# Patient Record
Sex: FEMALE | Race: WHITE | NOT HISPANIC OR LATINO
[De-identification: names, ages, dates, MRNs, and addresses within clinical notes are randomized per-mention and may not be internally consistent; named-entity substitution may affect disease eponyms.]

---

## 2017-01-09 ENCOUNTER — APPOINTMENT (OUTPATIENT)
Dept: ORTHOPEDIC SURGERY | Facility: CLINIC | Age: 62
End: 2017-01-09

## 2017-05-23 ENCOUNTER — APPOINTMENT (OUTPATIENT)
Dept: ORTHOPEDIC SURGERY | Facility: CLINIC | Age: 62
End: 2017-05-23

## 2017-05-23 DIAGNOSIS — M65.321 TRIGGER FINGER, RIGHT INDEX FINGER: ICD-10-CM

## 2017-06-07 ENCOUNTER — APPOINTMENT (OUTPATIENT)
Dept: ORTHOPEDIC SURGERY | Facility: AMBULATORY SURGERY CENTER | Age: 62
End: 2017-06-07

## 2017-06-07 ENCOUNTER — RESULT REVIEW (OUTPATIENT)
Age: 62
End: 2017-06-07

## 2017-06-07 ENCOUNTER — OUTPATIENT (OUTPATIENT)
Dept: OUTPATIENT SERVICES | Facility: HOSPITAL | Age: 62
LOS: 1 days | Discharge: HOME | End: 2017-06-07
Payer: COMMERCIAL

## 2017-06-07 PROCEDURE — 26055 INCISE FINGER TENDON SHEATH: CPT | Mod: F5

## 2017-06-07 PROCEDURE — 26145 TENDON EXCISION PALM/FINGER: CPT | Mod: 59,F9

## 2017-06-12 DIAGNOSIS — M65.311 TRIGGER THUMB, RIGHT THUMB: ICD-10-CM

## 2017-06-12 DIAGNOSIS — M65.351 TRIGGER FINGER, RIGHT LITTLE FINGER: ICD-10-CM

## 2017-06-12 DIAGNOSIS — Z87.891 PERSONAL HISTORY OF NICOTINE DEPENDENCE: ICD-10-CM

## 2017-06-12 DIAGNOSIS — Z85.828 PERSONAL HISTORY OF OTHER MALIGNANT NEOPLASM OF SKIN: ICD-10-CM

## 2017-06-12 DIAGNOSIS — Z88.6 ALLERGY STATUS TO ANALGESIC AGENT: ICD-10-CM

## 2017-06-12 LAB — SURGICAL PATHOLOGY STUDY: SIGNIFICANT CHANGE UP

## 2017-06-16 ENCOUNTER — APPOINTMENT (OUTPATIENT)
Dept: ORTHOPEDIC SURGERY | Facility: CLINIC | Age: 62
End: 2017-06-16

## 2017-06-16 DIAGNOSIS — M65.351 TRIGGER FINGER, RIGHT LITTLE FINGER: ICD-10-CM

## 2017-06-16 DIAGNOSIS — M65.311 TRIGGER THUMB, RIGHT THUMB: ICD-10-CM

## 2018-05-10 ENCOUNTER — APPOINTMENT (OUTPATIENT)
Dept: ORTHOPEDIC SURGERY | Facility: CLINIC | Age: 63
End: 2018-05-10
Payer: COMMERCIAL

## 2018-05-10 DIAGNOSIS — M25.531 PAIN IN RIGHT WRIST: ICD-10-CM

## 2018-05-10 PROCEDURE — 99214 OFFICE O/P EST MOD 30 MIN: CPT | Mod: 25

## 2018-05-10 PROCEDURE — 20605 DRAIN/INJ JOINT/BURSA W/O US: CPT | Mod: RT

## 2018-05-10 PROCEDURE — 73110 X-RAY EXAM OF WRIST: CPT | Mod: 50

## 2018-11-14 ENCOUNTER — APPOINTMENT (OUTPATIENT)
Dept: ORTHOPEDIC SURGERY | Facility: CLINIC | Age: 63
End: 2018-11-14
Payer: COMMERCIAL

## 2018-11-14 DIAGNOSIS — M65.341 TRIGGER FINGER, RIGHT RING FINGER: ICD-10-CM

## 2018-11-14 PROCEDURE — 99214 OFFICE O/P EST MOD 30 MIN: CPT

## 2018-12-11 ENCOUNTER — MOBILE ON CALL (OUTPATIENT)
Age: 63
End: 2018-12-11

## 2018-12-11 ENCOUNTER — CLINICAL ADVICE (OUTPATIENT)
Age: 63
End: 2018-12-11

## 2018-12-18 ENCOUNTER — APPOINTMENT (OUTPATIENT)
Dept: ORTHOPEDIC SURGERY | Facility: CLINIC | Age: 63
End: 2018-12-18
Payer: COMMERCIAL

## 2018-12-18 VITALS — BODY MASS INDEX: 22.02 KG/M2 | WEIGHT: 129 LBS | HEIGHT: 64 IN | RESPIRATION RATE: 16 BRPM

## 2018-12-18 PROCEDURE — 99214 OFFICE O/P EST MOD 30 MIN: CPT | Mod: 25

## 2018-12-18 PROCEDURE — 20550 NJX 1 TENDON SHEATH/LIGAMENT: CPT | Mod: RT

## 2019-06-24 ENCOUNTER — APPOINTMENT (OUTPATIENT)
Dept: INTERNAL MEDICINE | Facility: CLINIC | Age: 64
End: 2019-06-24
Payer: MEDICARE

## 2019-06-24 ENCOUNTER — NON-APPOINTMENT (OUTPATIENT)
Age: 64
End: 2019-06-24

## 2019-06-24 VITALS — DIASTOLIC BLOOD PRESSURE: 72 MMHG | SYSTOLIC BLOOD PRESSURE: 120 MMHG

## 2019-06-24 VITALS
OXYGEN SATURATION: 99 % | HEIGHT: 64 IN | BODY MASS INDEX: 22.02 KG/M2 | HEART RATE: 83 BPM | TEMPERATURE: 98.6 F | WEIGHT: 129 LBS | SYSTOLIC BLOOD PRESSURE: 130 MMHG | DIASTOLIC BLOOD PRESSURE: 76 MMHG

## 2019-06-24 DIAGNOSIS — Z78.9 OTHER SPECIFIED HEALTH STATUS: ICD-10-CM

## 2019-06-24 DIAGNOSIS — Z87.891 PERSONAL HISTORY OF NICOTINE DEPENDENCE: ICD-10-CM

## 2019-06-24 DIAGNOSIS — Z91.018 ALLERGY TO OTHER FOODS: ICD-10-CM

## 2019-06-24 DIAGNOSIS — G44.009 CLUSTER HEADACHE SYNDROME, UNSPECIFIED, NOT INTRACTABLE: ICD-10-CM

## 2019-06-24 DIAGNOSIS — Z82.69 FAMILY HISTORY OF OTHER DISEASES OF THE MUSCULOSKELETAL SYSTEM AND CONNECTIVE TISSUE: ICD-10-CM

## 2019-06-24 DIAGNOSIS — G44.049 CHRONIC PAROXYSMAL HEMICRANIA, NOT INTRACTABLE: ICD-10-CM

## 2019-06-24 DIAGNOSIS — Z98.890 OTHER SPECIFIED POSTPROCEDURAL STATES: ICD-10-CM

## 2019-06-24 DIAGNOSIS — Z81.8 FAMILY HISTORY OF OTHER MENTAL AND BEHAVIORAL DISORDERS: ICD-10-CM

## 2019-06-24 DIAGNOSIS — Z83.3 FAMILY HISTORY OF DIABETES MELLITUS: ICD-10-CM

## 2019-06-24 DIAGNOSIS — Z82.49 FAMILY HISTORY OF ISCHEMIC HEART DISEASE AND OTHER DISEASES OF THE CIRCULATORY SYSTEM: ICD-10-CM

## 2019-06-24 DIAGNOSIS — Z82.3 FAMILY HISTORY OF STROKE: ICD-10-CM

## 2019-06-24 PROCEDURE — G0402 INITIAL PREVENTIVE EXAM: CPT

## 2019-06-24 PROCEDURE — G0404: CPT

## 2019-06-24 PROCEDURE — 36415 COLL VENOUS BLD VENIPUNCTURE: CPT

## 2019-06-24 RX ORDER — TRAMADOL HYDROCHLORIDE 50 MG/1
50 TABLET, COATED ORAL
Refills: 0 | Status: ACTIVE | COMMUNITY
Start: 2019-06-24

## 2019-06-24 RX ORDER — MULTIVIT-MIN/FOLIC/VIT K/LYCOP 400-300MCG
1000 TABLET ORAL
Refills: 0 | Status: ACTIVE | COMMUNITY
Start: 2019-06-24

## 2019-07-18 ENCOUNTER — LABORATORY RESULT (OUTPATIENT)
Age: 64
End: 2019-07-18

## 2019-07-18 ENCOUNTER — APPOINTMENT (OUTPATIENT)
Dept: RHEUMATOLOGY | Facility: CLINIC | Age: 64
End: 2019-07-18
Payer: MEDICARE

## 2019-07-18 VITALS
BODY MASS INDEX: 22.02 KG/M2 | HEIGHT: 64 IN | WEIGHT: 129 LBS | SYSTOLIC BLOOD PRESSURE: 149 MMHG | HEART RATE: 80 BPM | DIASTOLIC BLOOD PRESSURE: 78 MMHG | TEMPERATURE: 98.2 F | OXYGEN SATURATION: 98 %

## 2019-07-18 DIAGNOSIS — M65.4 RADIAL STYLOID TENOSYNOVITIS [DE QUERVAIN]: ICD-10-CM

## 2019-07-18 DIAGNOSIS — Z85.828 PERSONAL HISTORY OF OTHER MALIGNANT NEOPLASM OF SKIN: ICD-10-CM

## 2019-07-18 DIAGNOSIS — M24.131 OTHER ARTICULAR CARTILAGE DISORDERS, RIGHT WRIST: ICD-10-CM

## 2019-07-18 DIAGNOSIS — M25.531 PAIN IN RIGHT WRIST: ICD-10-CM

## 2019-07-18 DIAGNOSIS — M25.532 PAIN IN RIGHT WRIST: ICD-10-CM

## 2019-07-18 DIAGNOSIS — H04.123 DRY EYE SYNDROME OF BILATERAL LACRIMAL GLANDS: ICD-10-CM

## 2019-07-18 PROCEDURE — 99205 OFFICE O/P NEW HI 60 MIN: CPT | Mod: 25

## 2019-07-18 PROCEDURE — 36415 COLL VENOUS BLD VENIPUNCTURE: CPT

## 2019-07-18 NOTE — REVIEW OF SYSTEMS
[Feeling Tired] : feeling tired [Dry Eyes] : dryness of the eyes [Orthopnea] : orthopnea [Vomiting] : vomiting [Arthralgias] : arthralgias [Joint Pain] : joint pain [Limb Pain] : limb pain [Fever] : no fever [Chills] : no chills [Feeling Poorly] : not feeling poorly [Eye Pain] : no eye pain [Red Eyes] : eyes not red [Eyesight Problems] : no eyesight problems [Discharge From Eyes] : no purulent discharge from the eyes [Eyes Itch] : no itching of the eyes [Earache] : no earache [Loss Of Hearing] : no hearing loss [Sore Throat] : no sore throat [Hoarseness] : no hoarseness [Heart Rate Is Slow] : the heart rate was not slow [Heart Rate Is Fast] : the heart rate was not fast [Chest Pain] : no chest pain [Shortness Of Breath] : no shortness of breath [Cough] : no cough [SOB on Exertion] : no shortness of breath during exertion [Abdominal Pain] : no abdominal pain [Constipation] : no constipation [Diarrhea] : no diarrhea [Heartburn] : no heartburn [Melena] : no melena [Dysuria] : no dysuria [Incontinence] : no incontinence [Joint Swelling] : no joint swelling [Joint Stiffness] : no joint stiffness [Limb Swelling] : no limb swelling [Skin Lesions] : no skin lesions [Skin Wound] : no skin wound [Itching] : no itching [Change In A Mole] : no change in a mole [Dizziness] : no dizziness [Fainting] : no fainting [Anxiety] : no anxiety [Depression] : no depression [Easy Bleeding] : no tendency for easy bleeding [Easy Bruising] : no tendency for easy bruising [Swollen Glands] : no swollen glands [Swollen Glands In The Neck] : no swollen glands in the neck [FreeTextEntry7] : Nausea , vomiting with HA

## 2019-07-18 NOTE — HISTORY OF PRESENT ILLNESS
[Fatigue] : fatigue [Dry Mouth] : dry mouth [Decreased ROM] : decreased range of motion [Difficulty Walking] : difficulty walking [Dry Eyes] : dry eyes [FreeTextEntry1] : Pt referred by Dr. Beatty and Dr. Saunders for Rheumatology evaluation\par 63 year old very pleasant female,  right hand dominant. \par She has multiple hand and wrist related problems, including multiple finger recurrent stenosing tenosynovitis, required steroid injections and release surgeries all fingers on right except 4th , right TFCC tear including ulnar styloid and foveal attachment. She is also complaining of right radial wrist pain. \par left De Quervain's tenosynovitis and s/p release surgery. \par Most recently she is experiencing left ulnar styloid pain radiating to left elbow. \par surgical path: Fibrotic tenosynovium with focal old hemorrhage, no active inflammation reported. \par R hip pain, she thinks compensatory behavior bc of back pain after injury. \par intermittently Rt> more hand function loss, can not open the jars. \par She had recently Rheum work up  negative RF, CCP, ESR, CRP and MADDY\par MRI of the R wrist performed 12/2018 reactive edema and cystic change within the ulnar styloid with associated fluid and debris in the pre styloids recess, possibility of inflammatory arthropathy should be considered. 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 \par She was told has dry eyes and uses  Restasis for dry eyes, but pt does not feel any sicca symptoms, able to produce tears. \par At age 27 she had traumatic fall, with vertebral fracture, required surgery. \par ROS: negative for alopecia, photosensitivity, Raynaud's,  psoriasis \par chronic and intermittent 2nd left toe  pain,  suspected mortons neuroma, but she did not proceed for US. \par She does editorial work and uses computer/typing. \par No personal or family hx of autoimmune disease. \par \par  [Anorexia] : no anorexia [Weight Loss] : no weight loss [Fever] : no fever [Chills] : no chills [Depression] : no depression [Malar Facial Rash] : no malar facial rash [Skin Lesions] : no lesions [Skin Nodules] : no skin nodules [Oral Ulcers] : no oral ulcers [Cough] : no cough [Shortness of Breath] : no shortness of breath [Chest Pain] : no chest pain [Morning Stiffness] : no morning stiffness [Falls] : no falls [Difficulty Standing] : no difficulty standing [Dyspnea] : no dyspnea [Myalgias] : no myalgias [Muscle Weakness] : no muscle weakness [Muscle Spasms] : no muscle spasms [Muscle Cramping] : no muscle cramping [Visual Changes] : no visual changes [Eye Pain] : no eye pain [Eye Redness] : no eye redness

## 2019-07-18 NOTE — ASSESSMENT
[FreeTextEntry1] : 63 year old very pleasant female referred for Rheum consultation. \par She has multiple hand and wrist related problems, including multiple finger recurrent stenosing tenosynovitis, required steroid injections and release surgeries all fingers on right except 4th , right TFCC tear including ulnar styloid and foveal attachment. She is also complaining of right radial wrist pain. \par left De Quervain's tenosynovitis and s/p release surgery. \par Most recently she is experiencing left ulnar styloid pain radiating to left elbow. \par surgical path: Fibrotic tenosynovium with focal old hemorrhage, no active inflammation reported. \par Rheum work up: negative RF, CCP, ESR, CRP and MADDY\par MRI of the R wrist performed 12/2018 reactive edema and cystic change within the ulnar styloid with associated fluid and debris in the pre styloids recess, possibility of inflammatory arthropathy should be considered. \par ON exam pt with no active synovitis or dactylitis and with normal Rheum labs, low suspicious for underlying inflammatory arthritis. \par Tears in the TFCC contain high signal fluid on fat-suppressed T1-weighted images. With a traumatic TFCC tear, fluid or contrast material is usually present in the distal radioulnar joint and ask  Ortho if prestyloid and styloid debris could be seen in TFCC tear ? \par No erosions reported on current MRI R wrist MRI. \par \par 1. Will obtain b/l hand and wrist Xray looking for RA or inflammatory arthritis related changes such as erosions, also will obatin b/l hand and wrist US. \par Uric acid level\par 2. Check sjogren's Ab given reported dry eyes. \par 3. Check lyme and tick disease serology. \par 4. Will discuss the case with Dr. Saunders \par \par \par f/u in 2 weeks

## 2019-07-18 NOTE — PHYSICAL EXAM
[General Appearance - Alert] : alert [General Appearance - In No Acute Distress] : in no acute distress [General Appearance - Well Nourished] : well nourished [General Appearance - Well Developed] : well developed [Sclera] : the sclera and conjunctiva were normal [PERRL With Normal Accommodation] : pupils were equal in size, round, and reactive to light [Outer Ear] : the ears and nose were normal in appearance [Hearing Threshold Finger Rub Not [place]] : hearing was normal [Examination Of The Oral Cavity] : the lips and gums were normal [Neck Appearance] : the appearance of the neck was normal [Neck Cervical Mass (___cm)] : no neck mass was observed [Jugular Venous Distention Increased] : there was no jugular-venous distention [Thyroid Diffuse Enlargement] : the thyroid was not enlarged [Thyroid Nodule] : there were no palpable thyroid nodules [Respiration, Rhythm And Depth] : normal respiratory rhythm and effort [Auscultation Breath Sounds / Voice Sounds] : lungs were clear to auscultation bilaterally [Heart Rate And Rhythm] : heart rate was normal and rhythm regular [Heart Sounds] : normal S1 and S2 [Heart Sounds Gallop] : no gallops [Murmurs] : no murmurs [Heart Sounds Pericardial Friction Rub] : no pericardial rub [Arterial Pulses Carotid] : carotid pulses were normal with no bruits [Edema] : there was no peripheral edema [Veins - Varicosity Changes] : there were no varicosital changes [Abdomen Soft] : soft [Abdomen Tenderness] : non-tender [Abdomen Mass (___ Cm)] : no abdominal mass palpated [Abdomen Hernia] : no hernia was discovered [Cervical Lymph Nodes Enlarged Posterior Bilaterally] : posterior cervical [Cervical Lymph Nodes Enlarged Anterior Bilaterally] : anterior cervical [Supraclavicular Lymph Nodes Enlarged Bilaterally] : supraclavicular [No CVA Tenderness] : no ~M costovertebral angle tenderness [No Spinal Tenderness] : no spinal tenderness [Abnormal Walk] : normal gait [Nail Clubbing] : no clubbing  or cyanosis of the fingernails [Musculoskeletal - Swelling] : no joint swelling seen [Motor Tone] : muscle strength and tone were normal [] : no rash [Skin Lesions] : no skin lesions [Sensation] : the sensory exam was normal to light touch and pinprick [Motor Exam] : the motor exam was normal [Affect] : the affect was normal [Mood] : the mood was normal [FreeTextEntry1] : left CMC tenderness and tenderness on b/l ulnar styloid with no synovitis, no MCP/PIP/DIP joint tenderness, synovitis, b/l wrist with mildy limited ROM, impaired  on left.

## 2019-07-19 ENCOUNTER — TRANSCRIPTION ENCOUNTER (OUTPATIENT)
Age: 64
End: 2019-07-19

## 2019-07-19 LAB
B BURGDOR AB SER-IMP: NEGATIVE
B BURGDOR IGG+IGM SER QL: 0.58 INDEX
ENA SS-A AB SER IA-ACNC: <0.2 AL
ENA SS-B AB SER IA-ACNC: <0.2 AL
URATE SERPL-MCNC: 4 MG/DL

## 2019-07-22 LAB
A PHAGOCYTOPH IGG TITR SER IF: NORMAL TITER
B BURGDOR AB SER QL IA: POSITIVE
B MICROTI IGG TITR SER: NORMAL TITER
E CHAFFEENSIS IGG TITR SER IF: NORMAL TITER

## 2019-07-23 LAB
25(OH)D3 SERPL-MCNC: 32.7 NG/ML
ALBUMIN SERPL ELPH-MCNC: 4.6 G/DL
ALP BLD-CCNC: 62 U/L
ALT SERPL-CCNC: 9 U/L
ANA SER IF-ACNC: NEGATIVE
ANION GAP SERPL CALC-SCNC: 14 MMOL/L
APPEARANCE: CLEAR
AST SERPL-CCNC: 14 U/L
BACTERIA: NEGATIVE
BASOPHILS # BLD AUTO: 0.04 K/UL
BASOPHILS NFR BLD AUTO: 1 %
BILIRUB SERPL-MCNC: 0.2 MG/DL
BILIRUBIN URINE: NEGATIVE
BLOOD URINE: NEGATIVE
BUN SERPL-MCNC: 8 MG/DL
CALCIUM SERPL-MCNC: 10 MG/DL
CCP AB SER IA-ACNC: 8 UNITS
CHLORIDE SERPL-SCNC: 103 MMOL/L
CHOLEST SERPL-MCNC: 176 MG/DL
CHOLEST/HDLC SERPL: 3.2 RATIO
CO2 SERPL-SCNC: 26 MMOL/L
COLOR: YELLOW
CREAT SERPL-MCNC: 0.7 MG/DL
CREAT SPEC-SCNC: 89 MG/DL
CRP SERPL-MCNC: <0.1 MG/DL
EOSINOPHIL # BLD AUTO: 0.12 K/UL
EOSINOPHIL NFR BLD AUTO: 3 %
ERYTHROCYTE [SEDIMENTATION RATE] IN BLOOD BY WESTERGREN METHOD: 5 MM/HR
ESTIMATED AVERAGE GLUCOSE: 108 MG/DL
FOLATE SERPL-MCNC: >20 NG/ML
GLUCOSE QUALITATIVE U: NEGATIVE
GLUCOSE SERPL-MCNC: 105 MG/DL
HBA1C MFR BLD HPLC: 5.4 %
HCT VFR BLD CALC: 42 %
HDLC SERPL-MCNC: 55 MG/DL
HGB BLD-MCNC: 13 G/DL
HYALINE CASTS: 0 /LPF
IMM GRANULOCYTES NFR BLD AUTO: 0.2 %
KETONES URINE: NEGATIVE
LDLC SERPL CALC-MCNC: 100 MG/DL
LEUKOCYTE ESTERASE URINE: NEGATIVE
LYMPHOCYTES # BLD AUTO: 1.25 K/UL
LYMPHOCYTES NFR BLD AUTO: 31.1 %
MAGNESIUM SERPL-MCNC: 2.2 MG/DL
MAN DIFF?: NORMAL
MCHC RBC-ENTMCNC: 31 GM/DL
MCHC RBC-ENTMCNC: 31.6 PG
MCV RBC AUTO: 102.2 FL
MICROALBUMIN 24H UR DL<=1MG/L-MCNC: <1.2 MG/DL
MICROALBUMIN/CREAT 24H UR-RTO: NORMAL MG/G
MICROSCOPIC-UA: NORMAL
MONOCYTES # BLD AUTO: 0.47 K/UL
MONOCYTES NFR BLD AUTO: 11.7 %
NEUTROPHILS # BLD AUTO: 2.13 K/UL
NEUTROPHILS NFR BLD AUTO: 53 %
NITRITE URINE: NEGATIVE
PH URINE: 6
PLATELET # BLD AUTO: 207 K/UL
POTASSIUM SERPL-SCNC: 5.2 MMOL/L
PROT SERPL-MCNC: 7.1 G/DL
PROTEIN URINE: NEGATIVE
RBC # BLD: 4.11 M/UL
RBC # FLD: 12.2 %
RED BLOOD CELLS URINE: 5 /HPF
RF+CCP IGG SER-IMP: NEGATIVE
RHEUMATOID FACT SER QL: 12 IU/ML
SODIUM SERPL-SCNC: 143 MMOL/L
SPECIFIC GRAVITY URINE: 1.02
SQUAMOUS EPITHELIAL CELLS: 1 /HPF
TRIGL SERPL-MCNC: 105 MG/DL
TSH SERPL-ACNC: 1.57 UIU/ML
UROBILINOGEN URINE: NORMAL
VIT B12 SERPL-MCNC: 1221 PG/ML
WBC # FLD AUTO: 4.02 K/UL
WHITE BLOOD CELLS URINE: 1 /HPF

## 2019-08-01 ENCOUNTER — APPOINTMENT (OUTPATIENT)
Dept: ULTRASOUND IMAGING | Facility: CLINIC | Age: 64
End: 2019-08-01

## 2019-08-01 ENCOUNTER — APPOINTMENT (OUTPATIENT)
Dept: RADIOLOGY | Facility: CLINIC | Age: 64
End: 2019-08-01

## 2019-08-01 LAB

## 2019-08-09 ENCOUNTER — APPOINTMENT (OUTPATIENT)
Dept: RHEUMATOLOGY | Facility: CLINIC | Age: 64
End: 2019-08-09

## 2019-08-29 ENCOUNTER — TRANSCRIPTION ENCOUNTER (OUTPATIENT)
Age: 64
End: 2019-08-29

## 2019-09-17 ENCOUNTER — APPOINTMENT (OUTPATIENT)
Dept: OTOLARYNGOLOGY | Facility: CLINIC | Age: 64
End: 2019-09-17
Payer: MEDICARE

## 2019-09-17 VITALS
DIASTOLIC BLOOD PRESSURE: 86 MMHG | WEIGHT: 128 LBS | HEART RATE: 82 BPM | SYSTOLIC BLOOD PRESSURE: 147 MMHG | HEIGHT: 64 IN | BODY MASS INDEX: 21.85 KG/M2

## 2019-09-17 DIAGNOSIS — H93.291 OTHER ABNORMAL AUDITORY PERCEPTIONS, RIGHT EAR: ICD-10-CM

## 2019-09-17 PROCEDURE — 92557 COMPREHENSIVE HEARING TEST: CPT

## 2019-09-17 PROCEDURE — G0268 REMOVAL OF IMPACTED WAX MD: CPT

## 2019-09-17 PROCEDURE — 99203 OFFICE O/P NEW LOW 30 MIN: CPT | Mod: 25

## 2019-09-17 PROCEDURE — 92567 TYMPANOMETRY: CPT

## 2019-09-17 NOTE — CONSULT LETTER
[Dear  ___] : Dear  [unfilled], [Consult Letter:] : I had the pleasure of evaluating your patient, [unfilled]. [Consult Closing:] : Thank you very much for allowing me to participate in the care of this patient.  If you have any questions, please do not hesitate to contact me. [Please see my note below.] : Please see my note below. [Sincerely,] : Sincerely, [FreeTextEntry3] : Heather Sanchez MD\par

## 2019-09-17 NOTE — HISTORY OF PRESENT ILLNESS
[de-identified] : WILEY MURPHY is a 64 year patient With right-sided hearing loss, fullness, and tinnitus for approximately one week. She was found to have cerumen impaction on exam. She tried over-the-counter wax removal drops. She felt her symptoms became worse. She has no otalgia, otorrhea, or dizziness\par \par History of recurrent ear infections-no\par Prior ear surgery-no\par History of otologic trauma-no\par Noise exposure-no\par Family history of hearing loss-no\par Prior audiogram-no recent testing

## 2019-09-17 NOTE — ASSESSMENT
[FreeTextEntry1] : Cerumen impaction was removed from the right ear. She felt better afterwards. Audiogram showed bilateral high-frequency sensorineural hearing loss\par \par PLAN\par \par -findings and management options discussed in detail with the patient. \par -good aural hygiene\par -avoid using cotton swabs in the ears\par -wax removal drops as needed. \par -noise precautions\par -annual audiogram\par -follow up in one year\par -call and return earlier if any concerns. \par

## 2019-09-24 ENCOUNTER — APPOINTMENT (OUTPATIENT)
Dept: OTOLARYNGOLOGY | Facility: CLINIC | Age: 64
End: 2019-09-24

## 2019-12-09 ENCOUNTER — APPOINTMENT (OUTPATIENT)
Dept: ORTHOPEDIC SURGERY | Facility: CLINIC | Age: 64
End: 2019-12-09
Payer: MEDICARE

## 2019-12-09 VITALS — RESPIRATION RATE: 16 BRPM | WEIGHT: 128 LBS | HEIGHT: 64 IN | BODY MASS INDEX: 21.85 KG/M2

## 2019-12-09 DIAGNOSIS — M25.532 PAIN IN LEFT WRIST: ICD-10-CM

## 2019-12-09 PROCEDURE — 20605 DRAIN/INJ JOINT/BURSA W/O US: CPT | Mod: LT

## 2019-12-09 PROCEDURE — 99214 OFFICE O/P EST MOD 30 MIN: CPT | Mod: 25

## 2021-03-03 ENCOUNTER — APPOINTMENT (OUTPATIENT)
Dept: INTERNAL MEDICINE | Facility: CLINIC | Age: 66
End: 2021-03-03

## 2021-05-12 ENCOUNTER — APPOINTMENT (OUTPATIENT)
Dept: INTERNAL MEDICINE | Facility: CLINIC | Age: 66
End: 2021-05-12

## 2021-09-27 ENCOUNTER — NON-APPOINTMENT (OUTPATIENT)
Age: 66
End: 2021-09-27

## 2021-09-27 ENCOUNTER — APPOINTMENT (OUTPATIENT)
Dept: OTOLARYNGOLOGY | Facility: CLINIC | Age: 66
End: 2021-09-27
Payer: MEDICARE

## 2021-09-27 VITALS
DIASTOLIC BLOOD PRESSURE: 76 MMHG | OXYGEN SATURATION: 97 % | TEMPERATURE: 97.8 F | SYSTOLIC BLOOD PRESSURE: 157 MMHG | BODY MASS INDEX: 21.85 KG/M2 | HEIGHT: 64 IN | WEIGHT: 128 LBS | HEART RATE: 78 BPM

## 2021-09-27 DIAGNOSIS — H61.23 IMPACTED CERUMEN, BILATERAL: ICD-10-CM

## 2021-09-27 PROCEDURE — 99214 OFFICE O/P EST MOD 30 MIN: CPT | Mod: 25

## 2021-09-27 PROCEDURE — 69210 REMOVE IMPACTED EAR WAX UNI: CPT

## 2021-09-27 NOTE — PHYSICAL EXAM
[Binocular Microscopic Exam] : Binocular microscopic exam was performed [Removed] : palatine tonsils previously removed [Normal] : no rashes [FreeTextEntry8] : deep cerumen impaction removed with suction after pretreatment w/ H2O2 [FreeTextEntry9] : cerumen impaction removed with suction

## 2021-09-27 NOTE — HISTORY OF PRESENT ILLNESS
[de-identified] : R ear itching & mild hearing loss; her husb says she turns the TV up too loud. No tinnitus. Denies: vertigo, ear pain, drainage, frequent loud noise exp/shooting, frequent infections, hx of ear surgery or IV antibiotics/chemo; denies a FHx of hearing loss. Qtip use: no but uses earplugs at night

## 2021-09-27 NOTE — ASSESSMENT
[FreeTextEntry1] : RTC when able for scheduled audio. \par \par Discussed the importance not putting qtips or other foreign objects in the ears.\par

## 2021-09-27 NOTE — REVIEW OF SYSTEMS
[Patient Intake Form Reviewed] : Patient intake form was reviewed [FreeTextEntry1] : as per HPI, all others negative

## 2021-10-12 ENCOUNTER — APPOINTMENT (OUTPATIENT)
Dept: OTOLARYNGOLOGY | Facility: CLINIC | Age: 66
End: 2021-10-12
Payer: MEDICARE

## 2021-10-12 ENCOUNTER — APPOINTMENT (OUTPATIENT)
Dept: OTOLARYNGOLOGY | Facility: CLINIC | Age: 66
End: 2021-10-12

## 2021-10-12 VITALS
HEART RATE: 79 BPM | WEIGHT: 128 LBS | SYSTOLIC BLOOD PRESSURE: 155 MMHG | DIASTOLIC BLOOD PRESSURE: 74 MMHG | BODY MASS INDEX: 21.85 KG/M2 | OXYGEN SATURATION: 66 % | HEIGHT: 64 IN | TEMPERATURE: 97.6 F

## 2021-10-12 PROCEDURE — 92550 TYMPANOMETRY & REFLEX THRESH: CPT

## 2021-10-12 PROCEDURE — 92557 COMPREHENSIVE HEARING TEST: CPT

## 2021-10-12 PROCEDURE — 99213 OFFICE O/P EST LOW 20 MIN: CPT

## 2021-10-12 NOTE — DATA REVIEWED
[de-identified] : 9/19: nl to mod SNHL AU\par - Immitance testing w/ type A AU\par today: stable hearing

## 2021-10-12 NOTE — HISTORY OF PRESENT ILLNESS
[de-identified] : R ear itching & mild hearing loss; her husb says she turns the TV up too loud. No tinnitus. Denies: vertigo, ear pain, drainage, frequent loud noise exp/shooting, frequent infections, hx of ear surgery or IV antibiotics/chemo; denies a FHx of hearing loss. Qtip use: no but uses earplugs at night\par Now s/p audio

## 2021-11-30 ENCOUNTER — APPOINTMENT (OUTPATIENT)
Dept: INTERNAL MEDICINE | Facility: CLINIC | Age: 66
End: 2021-11-30
Payer: MEDICARE

## 2021-11-30 VITALS
OXYGEN SATURATION: 97 % | DIASTOLIC BLOOD PRESSURE: 89 MMHG | HEART RATE: 102 BPM | TEMPERATURE: 97.3 F | HEIGHT: 64 IN | BODY MASS INDEX: 21.77 KG/M2 | WEIGHT: 127.5 LBS | SYSTOLIC BLOOD PRESSURE: 164 MMHG

## 2021-11-30 PROCEDURE — 93000 ELECTROCARDIOGRAM COMPLETE: CPT

## 2021-11-30 PROCEDURE — 99213 OFFICE O/P EST LOW 20 MIN: CPT | Mod: 25

## 2021-12-16 ENCOUNTER — OUTPATIENT (OUTPATIENT)
Dept: OUTPATIENT SERVICES | Facility: HOSPITAL | Age: 66
LOS: 1 days | End: 2021-12-16

## 2021-12-16 ENCOUNTER — APPOINTMENT (OUTPATIENT)
Dept: MAMMOGRAPHY | Facility: CLINIC | Age: 66
End: 2021-12-16
Payer: MEDICARE

## 2021-12-16 PROCEDURE — 77063 BREAST TOMOSYNTHESIS BI: CPT | Mod: 26

## 2021-12-16 PROCEDURE — 77067 SCR MAMMO BI INCL CAD: CPT | Mod: 26

## 2021-12-28 ENCOUNTER — APPOINTMENT (OUTPATIENT)
Dept: DERMATOLOGY | Facility: CLINIC | Age: 66
End: 2021-12-28
Payer: MEDICARE

## 2021-12-28 ENCOUNTER — NON-APPOINTMENT (OUTPATIENT)
Age: 66
End: 2021-12-28

## 2021-12-28 DIAGNOSIS — L50.3 DERMATOGRAPHIC URTICARIA: ICD-10-CM

## 2021-12-28 DIAGNOSIS — L29.9 PRURITUS, UNSPECIFIED: ICD-10-CM

## 2021-12-28 PROCEDURE — 99203 OFFICE O/P NEW LOW 30 MIN: CPT

## 2022-01-07 ENCOUNTER — APPOINTMENT (OUTPATIENT)
Dept: INTERNAL MEDICINE | Facility: CLINIC | Age: 67
End: 2022-01-07
Payer: MEDICARE

## 2022-01-07 VITALS
HEIGHT: 64 IN | WEIGHT: 127 LBS | TEMPERATURE: 98.2 F | HEART RATE: 90 BPM | SYSTOLIC BLOOD PRESSURE: 160 MMHG | OXYGEN SATURATION: 97 % | BODY MASS INDEX: 21.68 KG/M2 | DIASTOLIC BLOOD PRESSURE: 82 MMHG

## 2022-01-07 DIAGNOSIS — R09.89 OTHER SPECIFIED SYMPTOMS AND SIGNS INVOLVING THE CIRCULATORY AND RESPIRATORY SYSTEMS: ICD-10-CM

## 2022-01-07 PROCEDURE — 99214 OFFICE O/P EST MOD 30 MIN: CPT

## 2022-01-13 ENCOUNTER — APPOINTMENT (OUTPATIENT)
Dept: OTOLARYNGOLOGY | Facility: CLINIC | Age: 67
End: 2022-01-13
Payer: MEDICARE

## 2022-01-13 VITALS
DIASTOLIC BLOOD PRESSURE: 84 MMHG | OXYGEN SATURATION: 97 % | HEART RATE: 98 BPM | WEIGHT: 127 LBS | BODY MASS INDEX: 21.68 KG/M2 | TEMPERATURE: 97.7 F | SYSTOLIC BLOOD PRESSURE: 150 MMHG | HEIGHT: 64 IN

## 2022-01-13 PROCEDURE — 31575 DIAGNOSTIC LARYNGOSCOPY: CPT

## 2022-01-13 PROCEDURE — 99214 OFFICE O/P EST MOD 30 MIN: CPT | Mod: 25

## 2022-01-13 NOTE — PHYSICAL EXAM
[] : septum deviated to the right [Removed] : palatine tonsils previously removed [Laryngoscopy Performed] : laryngoscopy was performed, see procedure section for findings [Normal] : assessment of respiratory effort is normal

## 2022-01-13 NOTE — DATA REVIEWED
[de-identified] : 9/19: nl to mod SNHL AU\par - Immitance testing w/ type A AU\par 10/21: stable hearing

## 2022-01-13 NOTE — ASSESSMENT
[FreeTextEntry1] : Reassured her & discussed a possible MBS but since she hasn't had any episodes in nearly a month will hold off for now. Discussed safe swallowing techniques. \par \par Reviewed reflux precautions and provided the patient with the corresponding educational handout.\par \par Rpt audio 10/22\par

## 2022-01-13 NOTE — REASON FOR VISIT
[Subsequent Evaluation] : a subsequent evaluation for [FreeTextEntry2] : trouble with breathing while eating

## 2022-01-13 NOTE — PROCEDURE
[de-identified] : We discussed the elevated risk of liberation of viral particles from the nasopharynx if the patient were to be asymptomatically infected with COVID-19; after weighing the risks & benefits the decision was made to proceed. The procedure was performed while wearing appropriate PPE and a camera attached to a video system was used to maximize separation from the patient. The scope was handled appropriately. \par Indication: requirement for exam not possible via anterior examination; globus, laryngospasm\par After verbal consent and NO administration of an aerosolized phenylephrine/lidocaine mix, examination was performed with a flexible endoscope placed through the nose. Findings:\par Nasopharynx: unremarkable\par Soft palate, lateral and posterior pharyngeal walls: unremarkable\par Base of tongue & lingual tonsil: minimal retrodisplacement\par Vallecula: unremarkable\par Epiglottis: unremarkable\par Piriform sinuses and pharyngoesophageal junction: unremarkable\par Arytenoids and AE folds: mild interarytenoid edema\par Ventricle and false vocal folds: unremarkable\par True vocal folds: Smooth free edge w/ early TA bowing; surface without ectasias or edema; normal movement bilaterally with good apposition in phonation\par Visible subglottis: unremarkable\par Other findings: ELM\par

## 2022-01-13 NOTE — HISTORY OF PRESENT ILLNESS
[de-identified] : Five times since mid 11/21 & most recently 12/17/21 she's had episodes of sudden inability to breath for about a minute that begins after swallowing solids; this has caused her considerable anxiety. No hoarseness or michelle dysphagia but she has globus and some discomfort in the mid-thoracic area w/ swallowing cruciferous vegetables. Rare heartburn; weight is stable. No recent pnas. \par She also continues to feel like she can't hear well w/o tinnitus. Denies: vertigo, ear pain, drainage, frequent loud noise exp/shooting, frequent infections, hx of ear surgery or IV antibiotics/chemo; denies a FHx of hearing loss.

## 2022-01-27 ENCOUNTER — APPOINTMENT (OUTPATIENT)
Dept: PHYSICAL MEDICINE AND REHAB | Facility: CLINIC | Age: 67
End: 2022-01-27

## 2022-01-27 ENCOUNTER — APPOINTMENT (OUTPATIENT)
Dept: OTOLARYNGOLOGY | Facility: CLINIC | Age: 67
End: 2022-01-27
Payer: MEDICARE

## 2022-01-27 VITALS
DIASTOLIC BLOOD PRESSURE: 82 MMHG | WEIGHT: 127 LBS | OXYGEN SATURATION: 100 % | HEIGHT: 64 IN | BODY MASS INDEX: 21.68 KG/M2 | TEMPERATURE: 97.8 F | HEART RATE: 89 BPM | SYSTOLIC BLOOD PRESSURE: 130 MMHG

## 2022-01-27 DIAGNOSIS — J38.7 OTHER DISEASES OF LARYNX: ICD-10-CM

## 2022-01-27 DIAGNOSIS — H90.3 SENSORINEURAL HEARING LOSS, BILATERAL: ICD-10-CM

## 2022-01-27 PROCEDURE — 99214 OFFICE O/P EST MOD 30 MIN: CPT

## 2022-01-27 NOTE — PHYSICAL EXAM
[Removed] : palatine tonsils previously removed [Normal] : assessment of respiratory effort is normal

## 2022-01-27 NOTE — HISTORY OF PRESENT ILLNESS
[de-identified] : f/u for episodes of sudden inability to breath for about a minute that begins after swallowing solids; this has been happening since ~11/21. She has now had another episode last week and is extremely concerned about it. No hoarseness or michelle dysphagia but she has globus and some discomfort in the mid-thoracic area w/ swallowing cruciferous vegetables. Rare heartburn; weight is stable. No recent pnas. \par She also continues to feel like she can't hear well w/o tinnitus. Denies: vertigo, ear pain, drainage, frequent loud noise exp/shooting, frequent infections, hx of ear surgery or IV antibiotics/chemo; denies a FHx of hearing loss.

## 2022-01-27 NOTE — DATA REVIEWED
[de-identified] : 9/19: nl to mod SNHL AU\par - Immitance testing w/ type A AU\par 10/21: stable hearing

## 2022-02-16 ENCOUNTER — APPOINTMENT (OUTPATIENT)
Dept: RADIOLOGY | Facility: HOSPITAL | Age: 67
End: 2022-02-16

## 2022-03-16 ENCOUNTER — APPOINTMENT (OUTPATIENT)
Dept: INTERNAL MEDICINE | Facility: CLINIC | Age: 67
End: 2022-03-16
Payer: MEDICARE

## 2022-03-16 ENCOUNTER — NON-APPOINTMENT (OUTPATIENT)
Age: 67
End: 2022-03-16

## 2022-03-16 VITALS
TEMPERATURE: 98 F | HEIGHT: 62.5 IN | BODY MASS INDEX: 22.43 KG/M2 | SYSTOLIC BLOOD PRESSURE: 132 MMHG | WEIGHT: 125 LBS | HEART RATE: 77 BPM | DIASTOLIC BLOOD PRESSURE: 85 MMHG | OXYGEN SATURATION: 96 %

## 2022-03-16 DIAGNOSIS — J38.5 LARYNGEAL SPASM: ICD-10-CM

## 2022-03-16 DIAGNOSIS — R07.89 OTHER CHEST PAIN: ICD-10-CM

## 2022-03-16 PROCEDURE — 36415 COLL VENOUS BLD VENIPUNCTURE: CPT

## 2022-03-16 PROCEDURE — G0439: CPT

## 2022-03-16 PROCEDURE — 93000 ELECTROCARDIOGRAM COMPLETE: CPT

## 2022-03-30 ENCOUNTER — APPOINTMENT (OUTPATIENT)
Dept: INTERNAL MEDICINE | Facility: CLINIC | Age: 67
End: 2022-03-30
Payer: MEDICARE

## 2022-03-30 ENCOUNTER — NON-APPOINTMENT (OUTPATIENT)
Age: 67
End: 2022-03-30

## 2022-03-30 DIAGNOSIS — R19.4 CHANGE IN BOWEL HABIT: ICD-10-CM

## 2022-03-30 LAB
25(OH)D3 SERPL-MCNC: 37.8 NG/ML
ALBUMIN SERPL ELPH-MCNC: 4.6 G/DL
ALP BLD-CCNC: 56 U/L
ALT SERPL-CCNC: 13 U/L
ANION GAP SERPL CALC-SCNC: 13 MMOL/L
APPEARANCE: CLEAR
AST SERPL-CCNC: 23 U/L
BACTERIA: NEGATIVE
BASOPHILS # BLD AUTO: 0.04 K/UL
BASOPHILS NFR BLD AUTO: 1.2 %
BILIRUB SERPL-MCNC: 0.3 MG/DL
BILIRUBIN URINE: NEGATIVE
BLOOD URINE: NEGATIVE
BUN SERPL-MCNC: 7 MG/DL
CALCIUM SERPL-MCNC: 9.7 MG/DL
CHLORIDE SERPL-SCNC: 104 MMOL/L
CHOLEST SERPL-MCNC: 191 MG/DL
CO2 SERPL-SCNC: 25 MMOL/L
COLOR: YELLOW
CREAT SERPL-MCNC: 0.68 MG/DL
CREAT SPEC-SCNC: 54 MG/DL
EGFR: 95 ML/MIN/1.73M2
EOSINOPHIL # BLD AUTO: 0.09 K/UL
EOSINOPHIL NFR BLD AUTO: 2.7 %
ESTIMATED AVERAGE GLUCOSE: 105 MG/DL
FOLATE SERPL-MCNC: >20 NG/ML
GLUCOSE QUALITATIVE U: NEGATIVE
GLUCOSE SERPL-MCNC: 97 MG/DL
HBA1C MFR BLD HPLC: 5.3 %
HCT VFR BLD CALC: 41.5 %
HDLC SERPL-MCNC: 65 MG/DL
HEMOCCULT STL QL IA: NEGATIVE
HGB BLD-MCNC: 12.8 G/DL
HYALINE CASTS: 0 /LPF
IMM GRANULOCYTES NFR BLD AUTO: 0 %
KETONES URINE: NEGATIVE
LDLC SERPL CALC-MCNC: 108 MG/DL
LEUKOCYTE ESTERASE URINE: NEGATIVE
LYMPHOCYTES # BLD AUTO: 1.2 K/UL
LYMPHOCYTES NFR BLD AUTO: 36.1 %
MAGNESIUM SERPL-MCNC: 2.1 MG/DL
MAN DIFF?: NORMAL
MCHC RBC-ENTMCNC: 30.8 GM/DL
MCHC RBC-ENTMCNC: 32.1 PG
MCV RBC AUTO: 104 FL
MICROALBUMIN 24H UR DL<=1MG/L-MCNC: <1.2 MG/DL
MICROALBUMIN/CREAT 24H UR-RTO: NORMAL MG/G
MICROSCOPIC-UA: NORMAL
MONOCYTES # BLD AUTO: 0.37 K/UL
MONOCYTES NFR BLD AUTO: 11.1 %
NEUTROPHILS # BLD AUTO: 1.62 K/UL
NEUTROPHILS NFR BLD AUTO: 48.9 %
NITRITE URINE: NEGATIVE
NONHDLC SERPL-MCNC: 126 MG/DL
PH URINE: 7
PLATELET # BLD AUTO: 173 K/UL
POTASSIUM SERPL-SCNC: 5.2 MMOL/L
PROT SERPL-MCNC: 6.7 G/DL
PROTEIN URINE: NEGATIVE
RBC # BLD: 3.99 M/UL
RBC # FLD: 12.4 %
RED BLOOD CELLS URINE: 2 /HPF
SODIUM SERPL-SCNC: 143 MMOL/L
SPECIFIC GRAVITY URINE: 1.01
SQUAMOUS EPITHELIAL CELLS: 0 /HPF
TRIGL SERPL-MCNC: 90 MG/DL
TSH SERPL-ACNC: 1.1 UIU/ML
UROBILINOGEN URINE: NORMAL
VIT B12 SERPL-MCNC: 1166 PG/ML
WBC # FLD AUTO: 3.32 K/UL
WHITE BLOOD CELLS URINE: 0 /HPF

## 2022-03-30 PROCEDURE — 99441: CPT | Mod: 95

## 2022-03-31 PROBLEM — R19.4 FREQUENT BOWEL MOVEMENTS: Status: ACTIVE | Noted: 2022-03-31

## 2022-11-01 ENCOUNTER — APPOINTMENT (OUTPATIENT)
Dept: HEART AND VASCULAR | Facility: CLINIC | Age: 67
End: 2022-11-01

## 2022-11-01 VITALS
BODY MASS INDEX: 21.53 KG/M2 | WEIGHT: 117 LBS | HEIGHT: 62 IN | OXYGEN SATURATION: 98 % | TEMPERATURE: 98.4 F | DIASTOLIC BLOOD PRESSURE: 90 MMHG | SYSTOLIC BLOOD PRESSURE: 163 MMHG | HEART RATE: 86 BPM

## 2022-11-01 VITALS — DIASTOLIC BLOOD PRESSURE: 88 MMHG | SYSTOLIC BLOOD PRESSURE: 136 MMHG

## 2022-11-01 PROCEDURE — 99204 OFFICE O/P NEW MOD 45 MIN: CPT

## 2022-11-01 PROCEDURE — 93000 ELECTROCARDIOGRAM COMPLETE: CPT

## 2022-11-01 RX ORDER — BACLOFEN 20 MG/1
20 TABLET ORAL 3 TIMES DAILY
Refills: 0 | Status: ACTIVE | COMMUNITY
Start: 2022-11-01

## 2022-11-01 RX ORDER — OXYCODONE HYDROCHLORIDE AND ACETAMINOPHEN 10; 325 MG/1; MG/1
10-325 TABLET ORAL
Qty: 150 | Refills: 0 | Status: ACTIVE | COMMUNITY
Start: 2022-10-14

## 2022-11-01 RX ORDER — OXYCODONE HYDROCHLORIDE AND ACETAMINOPHEN 5; 325 MG/1; MG/1
5-325 TABLET ORAL
Refills: 0 | Status: DISCONTINUED | COMMUNITY
Start: 2019-06-24 | End: 2022-11-01

## 2022-11-01 RX ORDER — PROCHLORPERAZINE 25 MG/1
25 SUPPOSITORY RECTAL
Qty: 12 | Refills: 0 | Status: ACTIVE | COMMUNITY
Start: 2022-09-12

## 2022-11-01 NOTE — HISTORY OF PRESENT ILLNESS
[FreeTextEntry1] : 68 yo woman who presents for evaluation of palpitations and previously noted arrythmias. \par \par Since age 28 she had early beats noted. Over the years, she's been told that she has a skipped beat on occasion. She was also noted to have this when she was having a colonoscopy. She tends not to feel this. \par \par at visit to PCP in march, had reported intermittent palpitations.\par \par She report that is goes very fast and strong for about 3-5 beats and several times weekly. \par \par She is trying to get her back accustomed to new exercise routine. \par \par Her mother had 4 strokes which she survived \par \par note: intermittent palpitations: has happened a few times over the course of her life (starting in 30s). Over the last few years, has noticed more frequent episodes. Mother has afib, she's worried if she has this. Occurs usually at rest, not during exertion.\par father-  of old age, she has not seen her siblings in 40 years \par \par -lipid panel (2022): TG 90. Tchol 191, HDL 65, \par -A1c (2022): 5.3%\par \par was referred to me for cardiovascular prevention and risk factor optimization\par \par Lifestyle History:\par Mediterranean Diet Score (9 question survey) was 6. \par (8-9: optimal, 6-7: near-optimal, 4-5: suboptimal, 0-3: markedly suboptimal)\par Exercise: Patient reports exercising at a moderate level for ****** minutes per week. \par Smoking: Former smoker (1 PPD x 20 years; quit 20 years ago).\par Stress: Patient denies any stress. \par

## 2022-11-01 NOTE — DISCUSSION/SUMMARY
[FreeTextEntry1] : 66 yo woman who presents for evaluation of palpitations and previously noted arrythmias. \par \par Palpitations-\par Echo to assess for structural HD. \par Event monitor to understand the exact arrythmia that she is experiencing. \par \par HTN-\par Never previously on meds, but noted to have high blood pressure. Amlodipine started. Will follow-up BP on next visit. \par \par Lifestyle- \par Near-optimal on current regimen. Encouraged to continue healthy diet and increase her exercise as tolerated as her back pain is getting better.

## 2022-11-07 DIAGNOSIS — Z23 ENCOUNTER FOR IMMUNIZATION: ICD-10-CM

## 2022-11-15 ENCOUNTER — APPOINTMENT (OUTPATIENT)
Dept: INTERNAL MEDICINE | Facility: CLINIC | Age: 67
End: 2022-11-15

## 2022-12-05 ENCOUNTER — NON-APPOINTMENT (OUTPATIENT)
Age: 67
End: 2022-12-05

## 2022-12-17 ENCOUNTER — NON-APPOINTMENT (OUTPATIENT)
Age: 67
End: 2022-12-17

## 2022-12-27 ENCOUNTER — APPOINTMENT (OUTPATIENT)
Dept: HEART AND VASCULAR | Facility: CLINIC | Age: 67
End: 2022-12-27

## 2023-01-05 ENCOUNTER — NON-APPOINTMENT (OUTPATIENT)
Age: 68
End: 2023-01-05

## 2023-01-17 ENCOUNTER — APPOINTMENT (OUTPATIENT)
Dept: HEART AND VASCULAR | Facility: CLINIC | Age: 68
End: 2023-01-17
Payer: MEDICARE

## 2023-01-17 VITALS
SYSTOLIC BLOOD PRESSURE: 136 MMHG | DIASTOLIC BLOOD PRESSURE: 84 MMHG | HEIGHT: 62 IN | TEMPERATURE: 98.3 F | WEIGHT: 116.38 LBS | BODY MASS INDEX: 21.42 KG/M2 | HEART RATE: 93 BPM | OXYGEN SATURATION: 97 %

## 2023-01-17 PROCEDURE — 99214 OFFICE O/P EST MOD 30 MIN: CPT

## 2023-01-17 RX ORDER — AMLODIPINE BESYLATE 2.5 MG/1
2.5 TABLET ORAL DAILY
Qty: 90 | Refills: 3 | Status: COMPLETED | COMMUNITY
Start: 2022-11-01 | End: 2023-01-17

## 2023-01-17 NOTE — DISCUSSION/SUMMARY
[FreeTextEntry1] : Palps holter reviewed. symptoms discussed schedule echo pending insurance approval\par Borderline HTN low Na diet hold off on medications for now

## 2023-01-17 NOTE — REASON FOR VISIT
[Symptom and Test Evaluation] : symptom and test evaluation [FreeTextEntry1] : 67 year old female presents for a visit. She remarks on occasional palpitations. Symptoms noted at rest and less so with activity. Symptoms are intermittent. No dizziness or syncope. Holter completed.

## 2023-02-09 ENCOUNTER — APPOINTMENT (OUTPATIENT)
Dept: HEART AND VASCULAR | Facility: CLINIC | Age: 68
End: 2023-02-09

## 2023-02-15 ENCOUNTER — APPOINTMENT (OUTPATIENT)
Dept: HEART AND VASCULAR | Facility: CLINIC | Age: 68
End: 2023-02-15
Payer: MEDICARE

## 2023-02-15 VITALS
HEART RATE: 84 BPM | SYSTOLIC BLOOD PRESSURE: 162 MMHG | OXYGEN SATURATION: 100 % | TEMPERATURE: 98.5 F | DIASTOLIC BLOOD PRESSURE: 82 MMHG | BODY MASS INDEX: 21.4 KG/M2 | HEIGHT: 62 IN | WEIGHT: 116.31 LBS

## 2023-02-15 VITALS — SYSTOLIC BLOOD PRESSURE: 146 MMHG | DIASTOLIC BLOOD PRESSURE: 82 MMHG

## 2023-02-15 PROCEDURE — 99214 OFFICE O/P EST MOD 30 MIN: CPT

## 2023-02-15 PROCEDURE — 93306 TTE W/DOPPLER COMPLETE: CPT

## 2023-02-16 NOTE — DISCUSSION/SUMMARY
[FreeTextEntry1] : Palpitations echo and holter reviewed. Inclined towards a conservative follow up in this patient. We had a careful discussion regarding diet and exercise. Will be happy to re-evaluate.\par Borderline HTN - WILEY  and I had an extensive discussion regarding his blood pressure management. Patient will continue taking current medications in addition to maintaining a low Na diet, with periodic b/p checks at home.\par

## 2023-02-16 NOTE — REASON FOR VISIT
[Symptom and Test Evaluation] : symptom and test evaluation [FreeTextEntry1] : 67 year old female presents for a visit. She remarks on occasional palpitations. Symptoms noted at rest and less so with activity. Symptoms are intermittent. No dizziness or syncope. Holter completed. s/p echo now.

## 2023-03-07 ENCOUNTER — APPOINTMENT (OUTPATIENT)
Dept: INTERNAL MEDICINE | Facility: CLINIC | Age: 68
End: 2023-03-07
Payer: MEDICARE

## 2023-03-07 VITALS
DIASTOLIC BLOOD PRESSURE: 68 MMHG | HEART RATE: 84 BPM | SYSTOLIC BLOOD PRESSURE: 134 MMHG | HEIGHT: 62 IN | WEIGHT: 112 LBS | OXYGEN SATURATION: 97 % | TEMPERATURE: 98.2 F | BODY MASS INDEX: 20.61 KG/M2

## 2023-03-07 PROCEDURE — 99214 OFFICE O/P EST MOD 30 MIN: CPT

## 2023-03-30 ENCOUNTER — OFFICE (OUTPATIENT)
Dept: URBAN - METROPOLITAN AREA CLINIC 92 | Facility: CLINIC | Age: 68
Setting detail: OPHTHALMOLOGY
End: 2023-03-30
Payer: MEDICARE

## 2023-03-30 ENCOUNTER — RX ONLY (RX ONLY)
Age: 68
End: 2023-03-30

## 2023-03-30 DIAGNOSIS — H35.431: ICD-10-CM

## 2023-03-30 DIAGNOSIS — H11.823: ICD-10-CM

## 2023-03-30 DIAGNOSIS — H35.3131: ICD-10-CM

## 2023-03-30 DIAGNOSIS — H35.433: ICD-10-CM

## 2023-03-30 DIAGNOSIS — H35.363: ICD-10-CM

## 2023-03-30 DIAGNOSIS — Z96.1: ICD-10-CM

## 2023-03-30 DIAGNOSIS — H35.432: ICD-10-CM

## 2023-03-30 DIAGNOSIS — H26.493: ICD-10-CM

## 2023-03-30 DIAGNOSIS — H35.54: ICD-10-CM

## 2023-03-30 DIAGNOSIS — H16.223: ICD-10-CM

## 2023-03-30 DIAGNOSIS — H02.89: ICD-10-CM

## 2023-03-30 PROCEDURE — 92014 COMPRE OPH EXAM EST PT 1/>: CPT | Performed by: SPECIALIST

## 2023-03-30 PROCEDURE — 92250 FUNDUS PHOTOGRAPHY W/I&R: CPT | Performed by: SPECIALIST

## 2023-03-30 ASSESSMENT — SPHEQUIV_DERIVED
OD_SPHEQUIV: -2.875
OS_SPHEQUIV: -4.125
OS_SPHEQUIV: -2.875
OS_SPHEQUIV: -3.125
OD_SPHEQUIV: -2.5
OD_SPHEQUIV: -3

## 2023-03-30 ASSESSMENT — REFRACTION_AUTOREFRACTION
OS_CYLINDER: +3.25
OD_CYLINDER: +2.75
OS_SPHERE: -5.75
OS_AXIS: 148
OD_AXIS: 3
OD_SPHERE: -4.25

## 2023-03-30 ASSESSMENT — REFRACTION_CURRENTRX
OS_OVR_VA: 20/
OS_SPHERE: -3.50
OD_SPHERE: -3.50
OD_VPRISM_DIRECTION: PROGS
OD_SPHERE: -3.50
OD_CYLINDER: +1.25
OD_AXIS: 016
OS_AXIS: 165
OD_CYLINDER: +1.50
OS_SPHERE: -3.75
OD_OVR_VA: 20/
OD_OVR_VA: 20/
OS_OVR_VA: 20/
OS_CYLINDER: +1.50
OS_ADD: +2.50
OS_CYLINDER: +1.25
OD_ADD: +2.50
OS_AXIS: 163
OS_VPRISM_DIRECTION: PROGS
OD_ADD: +2.25

## 2023-03-30 ASSESSMENT — REFRACTION_MANIFEST
OS_SPHERE: -3.75
OD_SPHERE: -3.50
OS_CYLINDER: +1.75
OD_CYLINDER: +2.00
OD_AXIS: 10
OD_ADD: +2.50
OS_AXIS: 150
OD_VA1: 20/25
OD_ADD: +2.50
OS_VA1: 20/30
OS_SPHERE: -4.25
OD_CYLINDER: +2.00
OS_ADD: +2.50
OD_AXIS: 010
OS_CYLINDER: +2.25
OS_ADD: +2.50
OS_AXIS: 150
OD_SPHERE: -4.00

## 2023-03-30 ASSESSMENT — KERATOMETRY
OD_K2POWER_DIOPTERS: 41.75
METHOD_AUTO_MANUAL: AUTO
OS_AXISANGLE_DEGREES: 95
OS_K2POWER_DIOPTERS: 43.25
OD_AXISANGLE_DEGREES: 80
OD_K1POWER_DIOPTERS: 40.50
OS_K1POWER_DIOPTERS: 41.00

## 2023-03-30 ASSESSMENT — AXIALLENGTH_DERIVED
OD_AL: 25.8264
OD_AL: 25.7682
OD_AL: 25.595
OS_AL: 25.3373
OS_AL: 25.4501
OS_AL: 25.9118

## 2023-03-30 ASSESSMENT — SUPERFICIAL PUNCTATE KERATITIS (SPK)
OS_SPK: 1+
OD_SPK: 1+

## 2023-03-30 ASSESSMENT — LID EXAM ASSESSMENTS
OS_MEIBOMITIS: LLL LUL 1+ 2+
OD_MEIBOMITIS: RLL RUL 1+ 2+

## 2023-03-30 ASSESSMENT — VISUAL ACUITY
OD_BCVA: 20/60
OS_BCVA: 20/40

## 2023-03-30 ASSESSMENT — CONFRONTATIONAL VISUAL FIELD TEST (CVF)
OS_FINDINGS: FULL
OD_FINDINGS: FULL

## 2023-03-30 ASSESSMENT — TONOMETRY
OD_IOP_MMHG: 18
OS_IOP_MMHG: 18

## 2023-04-03 NOTE — HISTORY OF PRESENT ILLNESS
[Spouse] : spouse [FreeTextEntry1] : f/u chronic back pain [de-identified] : April 2022 hurt lower back, saw ortho and had Xray that showed arthritis. Took course of cyclobenzaprine and steroids for 1 week. Saw neurologist who ordered MRI that showed facet fusion of L5-S1. Nerve ablation was discussed but wasn't able to have it done because 3 epidurals were not effective. Was already on opiates for cluster HA. Remaining option is spinal cord stimulation or other implantable devices, but she has exclusion criteria for all of the above. \par \par Going to look into meditation for chronic pain. Tried acupuncture years ago for prior lower back issue. Found psychologist for chronic pain\par \par Currently wearing TENS unit, unable to sit down.\par \par Discovers small stool that she's unaware she's passing sometimes when she goes to urinate. A year ago when she started using miralax she started having this problem, but stopped using the miralax a few months ago. \par \par Neurologist has prescribed ALYCIA supplement

## 2023-04-04 ENCOUNTER — APPOINTMENT (OUTPATIENT)
Dept: HEART AND VASCULAR | Facility: CLINIC | Age: 68
End: 2023-04-04

## 2023-04-25 ENCOUNTER — OFFICE (OUTPATIENT)
Dept: URBAN - METROPOLITAN AREA CLINIC 92 | Facility: CLINIC | Age: 68
Setting detail: OPHTHALMOLOGY
End: 2023-04-25
Payer: MEDICARE

## 2023-04-25 DIAGNOSIS — H35.433: ICD-10-CM

## 2023-04-25 DIAGNOSIS — H35.432: ICD-10-CM

## 2023-04-25 DIAGNOSIS — H35.363: ICD-10-CM

## 2023-04-25 DIAGNOSIS — H11.823: ICD-10-CM

## 2023-04-25 DIAGNOSIS — H16.223: ICD-10-CM

## 2023-04-25 DIAGNOSIS — H26.493: ICD-10-CM

## 2023-04-25 DIAGNOSIS — H35.3131: ICD-10-CM

## 2023-04-25 DIAGNOSIS — H35.54: ICD-10-CM

## 2023-04-25 DIAGNOSIS — H02.89: ICD-10-CM

## 2023-04-25 DIAGNOSIS — Z96.1: ICD-10-CM

## 2023-04-25 DIAGNOSIS — H35.431: ICD-10-CM

## 2023-04-25 PROCEDURE — 92014 COMPRE OPH EXAM EST PT 1/>: CPT | Performed by: OPHTHALMOLOGY

## 2023-04-25 ASSESSMENT — REFRACTION_CURRENTRX
OD_CYLINDER: +1.25
OS_OVR_VA: 20/
OD_OVR_VA: 20/
OS_AXIS: 163
OD_OVR_VA: 20/
OD_VPRISM_DIRECTION: PROGS
OS_SPHERE: -3.75
OD_SPHERE: -3.50
OS_AXIS: 165
OD_SPHERE: -3.50
OS_CYLINDER: +1.50
OS_VPRISM_DIRECTION: PROGS
OD_ADD: +2.25
OS_ADD: +2.50
OD_AXIS: 016
OS_SPHERE: -3.50
OD_CYLINDER: +1.50
OS_OVR_VA: 20/
OD_ADD: +2.50
OS_CYLINDER: +1.25

## 2023-04-25 ASSESSMENT — REFRACTION_MANIFEST
OS_AXIS: 150
OS_ADD: +2.50
OS_ADD: +2.50
OD_CYLINDER: +2.00
OS_ADD: +2.50
OD_VA1: 20/40-
OD_SPHERE: -4.00
OS_CYLINDER: +1.75
OD_AXIS: 10
OD_AXIS: 5
OS_SPHERE: -5.25
OD_CYLINDER: +2.00
OD_ADD: +2.50
OD_SPHERE: -4.00
OD_AXIS: 010
OS_VA1: 20/40-2
OS_VA1: 20/30
OD_SPHERE: -3.50
OS_SPHERE: -4.25
OU_VA: 20/30
OS_CYLINDER: +2.75
OD_VA2: 20/20
OD_VA1: 20/25
OD_ADD: +2.50
OD_CYLINDER: +2.50
OS_SPHERE: -3.75
OS_CYLINDER: +2.25
OS_VA2: 20/20
OS_AXIS: 150
OD_ADD: +2.50
OS_AXIS: 145

## 2023-04-25 ASSESSMENT — KERATOMETRY
OS_K1POWER_DIOPTERS: 40.75
OD_K2POWER_DIOPTERS: 41.50
OD_AXISANGLE_DEGREES: 082
OS_K2POWER_DIOPTERS: 42.25
OS_AXISANGLE_DEGREES: 102
METHOD_AUTO_MANUAL: AUTO
OD_K1POWER_DIOPTERS: 40.25

## 2023-04-25 ASSESSMENT — SPHEQUIV_DERIVED
OD_SPHEQUIV: -3
OD_SPHEQUIV: -2.75
OD_SPHEQUIV: -2.5
OS_SPHEQUIV: -3.125
OD_SPHEQUIV: -2.5
OS_SPHEQUIV: -2.875
OS_SPHEQUIV: -4.25
OS_SPHEQUIV: -3.875

## 2023-04-25 ASSESSMENT — LID EXAM ASSESSMENTS
OD_MEIBOMITIS: RLL 1+
OS_MEIBOMITIS: LLL 1+

## 2023-04-25 ASSESSMENT — SUPERFICIAL PUNCTATE KERATITIS (SPK)
OD_SPK: 1+
OS_SPK: 1+

## 2023-04-25 ASSESSMENT — AXIALLENGTH_DERIVED
OD_AL: 25.9369
OD_AL: 25.7036
OD_AL: 25.8197
OS_AL: 25.6049
OD_AL: 25.7036
OS_AL: 26.2519
OS_AL: 26.0722
OS_AL: 25.7201

## 2023-04-25 ASSESSMENT — REFRACTION_AUTOREFRACTION
OD_SPHERE: -4.00
OD_CYLINDER: +3.00
OS_SPHERE: -5.75
OS_CYLINDER: +3.00
OD_AXIS: 007
OS_AXIS: 141

## 2023-04-25 ASSESSMENT — CONFRONTATIONAL VISUAL FIELD TEST (CVF)
OS_FINDINGS: FULL
OD_FINDINGS: FULL

## 2023-04-25 ASSESSMENT — VISUAL ACUITY
OS_BCVA: 20/60
OD_BCVA: 20/70+2

## 2023-05-09 ENCOUNTER — APPOINTMENT (OUTPATIENT)
Dept: INTERNAL MEDICINE | Facility: CLINIC | Age: 68
End: 2023-05-09
Payer: MEDICARE

## 2023-05-09 PROCEDURE — 36415 COLL VENOUS BLD VENIPUNCTURE: CPT

## 2023-05-16 ENCOUNTER — APPOINTMENT (OUTPATIENT)
Dept: INTERNAL MEDICINE | Facility: CLINIC | Age: 68
End: 2023-05-16
Payer: MEDICARE

## 2023-05-16 VITALS
OXYGEN SATURATION: 97 % | DIASTOLIC BLOOD PRESSURE: 72 MMHG | TEMPERATURE: 98.2 F | HEART RATE: 68 BPM | BODY MASS INDEX: 20.09 KG/M2 | SYSTOLIC BLOOD PRESSURE: 121 MMHG | HEIGHT: 62.5 IN | WEIGHT: 112 LBS

## 2023-05-16 DIAGNOSIS — R63.4 ABNORMAL WEIGHT LOSS: ICD-10-CM

## 2023-05-16 DIAGNOSIS — M51.9 UNSPECIFIED THORACIC, THORACOLUMBAR AND LUMBOSACRAL INTERVERTEBRAL DISC DISORDER: ICD-10-CM

## 2023-05-16 DIAGNOSIS — Z12.11 ENCOUNTER FOR SCREENING FOR MALIGNANT NEOPLASM OF COLON: ICD-10-CM

## 2023-05-16 LAB
25(OH)D3 SERPL-MCNC: 53.5 NG/ML
ALBUMIN SERPL ELPH-MCNC: 4.9 G/DL
ALP BLD-CCNC: 52 U/L
ALT SERPL-CCNC: 13 U/L
ANION GAP SERPL CALC-SCNC: 11 MMOL/L
APPEARANCE: CLEAR
AST SERPL-CCNC: 18 U/L
BACTERIA: NEGATIVE /HPF
BASOPHILS # BLD AUTO: 0.03 K/UL
BASOPHILS NFR BLD AUTO: 0.7 %
BILIRUB SERPL-MCNC: 0.5 MG/DL
BILIRUBIN URINE: NEGATIVE
BLOOD URINE: NEGATIVE
BUN SERPL-MCNC: 12 MG/DL
CALCIUM SERPL-MCNC: 9.8 MG/DL
CAST: 0 /LPF
CHLORIDE SERPL-SCNC: 102 MMOL/L
CHOLEST SERPL-MCNC: 195 MG/DL
CO2 SERPL-SCNC: 28 MMOL/L
COLOR: YELLOW
CREAT SERPL-MCNC: 0.78 MG/DL
CREAT SPEC-SCNC: 60 MG/DL
EGFR: 82 ML/MIN/1.73M2
EOSINOPHIL # BLD AUTO: 0.12 K/UL
EOSINOPHIL NFR BLD AUTO: 3 %
EPITHELIAL CELLS: 0 /HPF
ESTIMATED AVERAGE GLUCOSE: 111 MG/DL
FOLATE SERPL-MCNC: >20 NG/ML
GLUCOSE QUALITATIVE U: NEGATIVE MG/DL
GLUCOSE SERPL-MCNC: 113 MG/DL
HBA1C MFR BLD HPLC: 5.5 %
HCT VFR BLD CALC: 44.5 %
HDLC SERPL-MCNC: 74 MG/DL
HGB BLD-MCNC: 13.9 G/DL
IMM GRANULOCYTES NFR BLD AUTO: 0 %
KETONES URINE: NEGATIVE MG/DL
LDLC SERPL CALC-MCNC: 102 MG/DL
LEUKOCYTE ESTERASE URINE: NEGATIVE
LYMPHOCYTES # BLD AUTO: 1.4 K/UL
LYMPHOCYTES NFR BLD AUTO: 34.7 %
MAGNESIUM SERPL-MCNC: 2.2 MG/DL
MAN DIFF?: NORMAL
MCHC RBC-ENTMCNC: 31.2 GM/DL
MCHC RBC-ENTMCNC: 32.9 PG
MCV RBC AUTO: 105.2 FL
MICROALBUMIN 24H UR DL<=1MG/L-MCNC: <1.2 MG/DL
MICROALBUMIN/CREAT 24H UR-RTO: NORMAL MG/G
MICROSCOPIC-UA: NORMAL
MONOCYTES # BLD AUTO: 0.43 K/UL
MONOCYTES NFR BLD AUTO: 10.7 %
NEUTROPHILS # BLD AUTO: 2.05 K/UL
NEUTROPHILS NFR BLD AUTO: 50.9 %
NITRITE URINE: NEGATIVE
NONHDLC SERPL-MCNC: 122 MG/DL
PH URINE: 7
PLATELET # BLD AUTO: 163 K/UL
POTASSIUM SERPL-SCNC: 4.7 MMOL/L
PROT SERPL-MCNC: 7.1 G/DL
PROTEIN URINE: NEGATIVE MG/DL
RBC # BLD: 4.23 M/UL
RBC # FLD: 12.6 %
RED BLOOD CELLS URINE: 1 /HPF
SODIUM SERPL-SCNC: 141 MMOL/L
SPECIFIC GRAVITY URINE: 1.01
TRIGL SERPL-MCNC: 100 MG/DL
TSH SERPL-ACNC: 1.82 UIU/ML
UROBILINOGEN URINE: 0.2 MG/DL
VIT B12 SERPL-MCNC: 1303 PG/ML
WBC # FLD AUTO: 4.03 K/UL
WHITE BLOOD CELLS URINE: 0 /HPF

## 2023-05-16 PROCEDURE — 99213 OFFICE O/P EST LOW 20 MIN: CPT | Mod: 25

## 2023-05-16 PROCEDURE — G0439: CPT

## 2023-05-31 NOTE — REVIEW OF SYSTEMS
[Incontinence] : incontinence [Joint Stiffness] : joint stiffness [Back Pain] : back pain [Negative] : Heme/Lymph

## 2023-05-31 NOTE — PHYSICAL EXAM
[No Acute Distress] : no acute distress [Well Nourished] : well nourished [Well Developed] : well developed [Well-Appearing] : well-appearing [Normal Sclera/Conjunctiva] : normal sclera/conjunctiva [Normal Outer Ear/Nose] : the outer ears and nose were normal in appearance [Normal Oropharynx] : the oropharynx was normal [No JVD] : no jugular venous distention [No Lymphadenopathy] : no lymphadenopathy [Supple] : supple [Thyroid Normal, No Nodules] : the thyroid was normal and there were no nodules present [No Respiratory Distress] : no respiratory distress  [No Accessory Muscle Use] : no accessory muscle use [Clear to Auscultation] : lungs were clear to auscultation bilaterally [Normal Rate] : normal rate  [Regular Rhythm] : with a regular rhythm [Normal S1, S2] : normal S1 and S2 [No Murmur] : no murmur heard [No Edema] : there was no peripheral edema [No Extremity Clubbing/Cyanosis] : no extremity clubbing/cyanosis [Soft] : abdomen soft [Non Tender] : non-tender [Non-distended] : non-distended [No Masses] : no abdominal mass palpated [No HSM] : no HSM [Normal Bowel Sounds] : normal bowel sounds [Normal Posterior Cervical Nodes] : no posterior cervical lymphadenopathy [Normal Anterior Cervical Nodes] : no anterior cervical lymphadenopathy [No Joint Swelling] : no joint swelling [Grossly Normal Strength/Tone] : grossly normal strength/tone [No Rash] : no rash [Coordination Grossly Intact] : coordination grossly intact [Normal Affect] : the affect was normal [Alert and Oriented x3] : oriented to person, place, and time [Normal Insight/Judgement] : insight and judgment were intact [de-identified] : very limited ROM of spine [de-identified] : stiff gait

## 2023-05-31 NOTE — HISTORY OF PRESENT ILLNESS
[Spouse] : spouse [FreeTextEntry1] : CPE [de-identified] : had abd US at [facility] but I don't have results\par \par Issue with urinating: feels like she has to manually stimulate urethra in order for urine to pass, otherwise bladder doesn't empty. Also sometimes has to think about if she needs to urinate as sometimes many hours pass and she realizes she hasn't urinated\par \par Her hemicrania is prescribing pain medications, needs them daily for lower back pain. Tried acupuncture, which she states she had a bad experience with. Sees psychologist for chronic pain. Has clonazepam prescription up to 3x/day prn. Only takes once a day, and this does seem to help her appetite.\par \par Unintentional weight loss. Loss off appetite, difficult to position herself during a meal. In order to make sure she's getting enough protein, she's taking a lunch vegan

## 2023-05-31 NOTE — HEALTH RISK ASSESSMENT
[0] : 2) Feeling down, depressed, or hopeless: Not at all (0) [[id number]] : 0 [Former] : Former [10-14] : 10-14 [> 15 Years] : > 15 Years

## 2023-06-13 ENCOUNTER — APPOINTMENT (OUTPATIENT)
Dept: HEART AND VASCULAR | Facility: CLINIC | Age: 68
End: 2023-06-13
Payer: MEDICARE

## 2023-06-13 VITALS
HEART RATE: 89 BPM | WEIGHT: 110.8 LBS | BODY MASS INDEX: 20.39 KG/M2 | HEIGHT: 62 IN | OXYGEN SATURATION: 97 % | TEMPERATURE: 97.9 F | SYSTOLIC BLOOD PRESSURE: 123 MMHG | DIASTOLIC BLOOD PRESSURE: 82 MMHG

## 2023-06-13 DIAGNOSIS — Q23.1 CONGENITAL INSUFFICIENCY OF AORTIC VALVE: ICD-10-CM

## 2023-06-13 DIAGNOSIS — R03.0 ELEVATED BLOOD-PRESSURE READING, W/OUT DIAGNOSIS OF HYPERTENSION: ICD-10-CM

## 2023-06-13 PROCEDURE — 99214 OFFICE O/P EST MOD 30 MIN: CPT

## 2023-06-13 NOTE — REASON FOR VISIT
[Symptom and Test Evaluation] : symptom and test evaluation [FreeTextEntry1] : 67 year old female presents for a visit. She remarks on occasional palpitations. Symptoms noted at rest and less so with activity. Symptoms are intermittent. No dizziness or syncope. Remarks on breast mass and requests referral for mammogram and u/s

## 2023-06-13 NOTE — DISCUSSION/SUMMARY
[FreeTextEntry1] : Breast mass mammogram and breast u/s ordered\par AV echo in 1 year\par HTN better diet and exercise\par Lipids reviewed\par

## 2023-06-14 DIAGNOSIS — N63.20 UNSPECIFIED LUMP IN THE LEFT BREAST, UNSPECIFIED QUADRANT: ICD-10-CM

## 2023-06-20 ENCOUNTER — APPOINTMENT (OUTPATIENT)
Dept: COLORECTAL SURGERY | Facility: CLINIC | Age: 68
End: 2023-06-20
Payer: MEDICARE

## 2023-06-20 ENCOUNTER — APPOINTMENT (OUTPATIENT)
Dept: NEUROSURGERY | Facility: CLINIC | Age: 68
End: 2023-06-20
Payer: MEDICARE

## 2023-06-20 VITALS
SYSTOLIC BLOOD PRESSURE: 132 MMHG | HEIGHT: 62 IN | BODY MASS INDEX: 20.24 KG/M2 | OXYGEN SATURATION: 96 % | DIASTOLIC BLOOD PRESSURE: 80 MMHG | TEMPERATURE: 98.4 F | HEART RATE: 84 BPM | WEIGHT: 110 LBS

## 2023-06-20 DIAGNOSIS — N31.9 NEUROMUSCULAR DYSFUNCTION OF BLADDER, UNSPECIFIED: ICD-10-CM

## 2023-06-20 PROCEDURE — 46600 DIAGNOSTIC ANOSCOPY SPX: CPT

## 2023-06-20 PROCEDURE — 99204 OFFICE O/P NEW MOD 45 MIN: CPT | Mod: 25

## 2023-06-20 PROCEDURE — 99204 OFFICE O/P NEW MOD 45 MIN: CPT

## 2023-06-20 NOTE — ASSESSMENT
[FreeTextEntry1] : This is a 66 yo F who presents for neurosurgical assessment with regards to bladder/bowel dysfunction and concern for neurogenic cause.\par \par MR T spine, MR L spine, and MR Pelvis w/o contrast warranted for our review\par \par Patient to undergo urology input as well with regards to her condition.\par \par Bowel regimen recommended considering chronic opiate use\par \par I will have the patient complete the above mentioned imaging/testing and return to our office in 4 weeks to review films and to devise a continued treatment plan moving forward.\par \par Nathalie Crump PA-C\par Lionel Narayan MD\par

## 2023-06-20 NOTE — HISTORY OF PRESENT ILLNESS
[FreeTextEntry1] : 68 yo F presents w/  for evaluation of stool seepage, referred by PCP Eleni Beatty\par PMH lumbar disk disease, hemicrania continuua, followed by Neuro Dr. Garfield Murphy\par h/o BCC\par Former cigarette use 1 ppd x 10 years, quit 15 years ago\par PSH back surgery: laminectomy, vertebral fusion (age 26), cataract surgery, corneal Lasik, ovarian cystectomy, tonsillectomy, breast augmentation, h/o back injury 3 years ago while running on treadmill, had seen orthopedist s/p epidural injections  and steroids, advised not candidate for radiofrequency ablation as she didn't respond to epidural injections\par , s/p termination. Denies h/o vaginal delivery\par \par Allergic to NSAIDS and nuts\par Last colonoscopy in  at [facility] which was negative per pt, has completed Cologard ~ 4 years ago, negative FOBT 2023. Hesitant to pursue colonoscopy as may trigger hemicrania continuua and in past had incomplete prep\par \par Per chart review, h/o lumbar disk disease, pt seen by PCP Jaci 23 c/o onset of bladder dysfunction and feeling need to stimulate urethra to void otherwise doesn't empty bladder. PCP referred to NEURO, UROL and MR lumbar spine. Also reported unintentional weight loss and loss of appetite, FOBT ordered which was negative.\par \par Pt has not completed lumbar MRI imaging yet as she reports the only change w/ her pain is onset of sacral pain for the last 6 months, however per pt, MRI order doesn't include sacral area and waiting for PCP office to update order. \par \par c/o of onset of stool seepage and finding "blueberry" sized stool in her underwear approximately 3 months ago. Denies anorectal pain, itching or bleeding. Admits to occasional anal stinging sensation when she showers which she attributes possibly to tissue paper inserted in anal area through out the day. \par Bladder symptoms possibly began around the same time. Reports she has to "focus" and relax to allow bladder to empty. Admits to losing 14 lbs over the last year or so which she attributes to stress related to chronic pain. Has tried TENS unit, but experienced neuropathic itching sensation and stopped using TENS\par Has appt w/ UROL in a few weeks.\par \par Denies prior manometry or MR defecography. Denies h/o pelvic floor PT\par \par Admits to straining w/ some BMs, able to evacuate fully w/ most BMs, otherwise will have to attempt defecation a few more times w/ success. Denies splinting.\par BH: has daily seepage, but passes firm/harder stool every other day while taking pain medication and takes Colace every other day. Has tried Miralax but was too\par Follows plant based diet (vegan), has high intake of dietary fiber and water\par Denies fiber supplement use\par \par Denies ASA/NSAID (allergic). Takes Tramadol, Percocet and Baclofen as needed for hemicrania continuua and back pain\par

## 2023-06-20 NOTE — ASSESSMENT
[FreeTextEntry1] : Long discussion with patient and her , reviewing her course, her symptoms and her prior history.\par Discussed fecal incontinence, underlying etiologies and possible causes.\par \par Bowel regimen discussed. \par Discussed the role of bulking agents. Discussed balanced approach given h/o constipation on long-term pain medication regimen/plan.\par Fiber goal 30g/day, recommend psyllium supplement. Educational material regarding fiber provided\par Adequate oral hydration - goal 64oz water/day\par \par Recommend anal manometry testing. Referral provided for testing. \par Recommended and discussed the role of pelvic physical therapy and biofeedback. Referral and contact information for several providing centers given to patient.\par \par Case also discussed with Dr Narayan and referred for neurosurgical evaluation. \par \par She is pending consultation with Urology.\par \par She requests Cologuard - recommend f/u with PCP and/or GI to order.\par \par All questions were answered, patient expressed understanding, and is agreeable to this plan.\par

## 2023-06-20 NOTE — PHYSICAL EXAM
[FreeTextEntry1] : Medical assistant present for duration of physical examination\par \par General no acute distress, alert and oriented\par Psych calm, pleasant demeanor, responding appropriately to questions\par Nonlabored breathing\par Ambulating without assistance\par Skin normal color and pigment, no visible lesions or rashes\par \par Anorectal Exam:\par Inspection no erythema, induration or fluctuance, no skin excoriation, no fissure, minimal external hemorrhoids nonthrombosed, no prolapsed tissue\par ALVINO nontender, no masses palpated, no blood on gloved finger, decreased tone at rest, weak squeeze on command\par \par Procedure: Anoscopy\par \par Pre procedure Diagnosis: fecal incontinence\par Post procedure Diagnosis: fecal incontinence\par Anesthesia: none\par Estimated blood loss: none\par Specimen: none\par Complications: none\par \par Consent obtained. Anoscopy was performed by passing a lighted anoscope with lubricant jelly into the anal canal and the entire anal mucosal surface was inspected. Findings included no fissure, mild internal hemorrhoids without inflammation, no visible masses or lesions\par \par Patient tolerated examination and procedure well.\par \par \par

## 2023-06-20 NOTE — HISTORY OF PRESENT ILLNESS
[de-identified] : This is a 68 yo F who presents for neurosurgical consultation accompanied by her . She has a pertinent past surgical history of L5-S1 fusion from a prior accident 20+ years prior. She notes urinary and fecal disturbances over the previous six months. She notes urinary retention and difficulty voiding; she often has to stimulate her urethral to void. With regards to bowel movements, she notes a component of chronic constipation due to narcotic/opiate use.  Stool seepage is new.\par \par She has remained under the care of Pain Management and has completed various interventional treatments such as lumbar epidurals which did not provide relief. She had been advised against SCS by her pain specialist. \par \par Hx of hemicrania continuum. \par \par NO IMAGING available for my review\par \par PHYSICAL EXAM: \par Constitutional: Well appearing, no distress\par HEENT: Normocephalic Atraumatic\par Psychiatric: Alert and oriented to all spheres, normal mood\par Pulmonary: No respiratory distress\par \par Neurologic: \par CN II-XII grossly intact\par Palpation: (+) lumbar/ sacrococcygeal tenderness\par Strength: Full strength in all major muscle groups\par Sensation: Full sensation to light touch in all extremities\par Reflexes: \par  2+ patellar\par  2+ ankle jerk\par \par ROM intact\par \par Signs:\par SLR negative\par \par Gait: steady, walking w/o assistance.\par

## 2023-06-20 NOTE — HISTORY OF PRESENT ILLNESS
[de-identified] : This is a 66 yo F who presents for neurosurgical consultation accompanied by her . She has a pertinent past surgical history of L5-S1 fusion from a prior accident 20+ years prior. She notes urinary and fecal disturbances over the previous six months. She notes urinary retention and difficulty voiding; she often has to stimulate her urethral to void. With regards to bowel movements, she notes a component of chronic constipation due to narcotic/opiate use.  Stool seepage is new.\par \par She has remained under the care of Pain Management and has completed various interventional treatments such as lumbar epidurals which did not provide relief. She had been advised against SCS by her pain specialist. \par \par Hx of hemicrania continuum. \par \par NO IMAGING available for my review\par \par PHYSICAL EXAM: \par Constitutional: Well appearing, no distress\par HEENT: Normocephalic Atraumatic\par Psychiatric: Alert and oriented to all spheres, normal mood\par Pulmonary: No respiratory distress\par \par Neurologic: \par CN II-XII grossly intact\par Palpation: (+) lumbar/ sacrococcygeal tenderness\par Strength: Full strength in all major muscle groups\par Sensation: Full sensation to light touch in all extremities\par Reflexes: \par  2+ patellar\par  2+ ankle jerk\par \par ROM intact\par \par Signs:\par SLR negative\par \par Gait: steady, walking w/o assistance.\par

## 2023-07-03 ENCOUNTER — RESULT REVIEW (OUTPATIENT)
Age: 68
End: 2023-07-03

## 2023-07-03 ENCOUNTER — APPOINTMENT (OUTPATIENT)
Dept: ULTRASOUND IMAGING | Facility: CLINIC | Age: 68
End: 2023-07-03
Payer: MEDICARE

## 2023-07-03 ENCOUNTER — OUTPATIENT (OUTPATIENT)
Dept: OUTPATIENT SERVICES | Facility: HOSPITAL | Age: 68
LOS: 1 days | End: 2023-07-03

## 2023-07-03 ENCOUNTER — APPOINTMENT (OUTPATIENT)
Dept: ENDOCRINOLOGY | Facility: CLINIC | Age: 68
End: 2023-07-03

## 2023-07-03 ENCOUNTER — APPOINTMENT (OUTPATIENT)
Dept: MAMMOGRAPHY | Facility: CLINIC | Age: 68
End: 2023-07-03
Payer: MEDICARE

## 2023-07-03 PROCEDURE — 77066 DX MAMMO INCL CAD BI: CPT | Mod: 26

## 2023-07-03 PROCEDURE — G0279: CPT | Mod: 26

## 2023-07-03 PROCEDURE — 76641 ULTRASOUND BREAST COMPLETE: CPT | Mod: 26,50

## 2023-07-06 ENCOUNTER — APPOINTMENT (OUTPATIENT)
Dept: MRI IMAGING | Facility: CLINIC | Age: 68
End: 2023-07-06
Payer: MEDICARE

## 2023-07-06 ENCOUNTER — OUTPATIENT (OUTPATIENT)
Dept: OUTPATIENT SERVICES | Facility: HOSPITAL | Age: 68
LOS: 1 days | End: 2023-07-06

## 2023-07-06 PROCEDURE — 72148 MRI LUMBAR SPINE W/O DYE: CPT | Mod: 26,MH

## 2023-07-06 PROCEDURE — 72195 MRI PELVIS W/O DYE: CPT | Mod: 26,MH

## 2023-07-08 ENCOUNTER — OUTPATIENT (OUTPATIENT)
Dept: OUTPATIENT SERVICES | Facility: HOSPITAL | Age: 68
LOS: 1 days | End: 2023-07-08

## 2023-07-08 ENCOUNTER — APPOINTMENT (OUTPATIENT)
Dept: MRI IMAGING | Facility: CLINIC | Age: 68
End: 2023-07-08
Payer: MEDICARE

## 2023-07-08 PROCEDURE — 72146 MRI CHEST SPINE W/O DYE: CPT | Mod: 26,MH

## 2023-07-25 ENCOUNTER — APPOINTMENT (OUTPATIENT)
Dept: NEUROSURGERY | Facility: CLINIC | Age: 68
End: 2023-07-25
Payer: MEDICARE

## 2023-07-25 VITALS
WEIGHT: 110 LBS | OXYGEN SATURATION: 96 % | SYSTOLIC BLOOD PRESSURE: 132 MMHG | DIASTOLIC BLOOD PRESSURE: 80 MMHG | RESPIRATION RATE: 16 BRPM | HEART RATE: 84 BPM | BODY MASS INDEX: 20.24 KG/M2 | HEIGHT: 62 IN | TEMPERATURE: 98.4 F

## 2023-07-25 DIAGNOSIS — R15.9 FULL INCONTINENCE OF FECES: ICD-10-CM

## 2023-07-25 DIAGNOSIS — R29.2 ABNORMAL REFLEX: ICD-10-CM

## 2023-07-25 PROCEDURE — 99214 OFFICE O/P EST MOD 30 MIN: CPT

## 2023-07-31 PROBLEM — R15.9 FECAL INCONTINENCE: Status: ACTIVE | Noted: 2023-03-07

## 2023-12-01 ENCOUNTER — TRANSCRIPTION ENCOUNTER (OUTPATIENT)
Age: 68
End: 2023-12-01

## 2023-12-18 ENCOUNTER — NON-APPOINTMENT (OUTPATIENT)
Age: 68
End: 2023-12-18

## 2024-06-14 ENCOUNTER — APPOINTMENT (OUTPATIENT)
Dept: INTERNAL MEDICINE | Facility: CLINIC | Age: 69
End: 2024-06-14

## 2024-06-21 ENCOUNTER — NON-APPOINTMENT (OUTPATIENT)
Age: 69
End: 2024-06-21

## 2024-06-21 ENCOUNTER — LABORATORY RESULT (OUTPATIENT)
Age: 69
End: 2024-06-21

## 2024-06-21 ENCOUNTER — APPOINTMENT (OUTPATIENT)
Dept: INTERNAL MEDICINE | Facility: CLINIC | Age: 69
End: 2024-06-21
Payer: MEDICARE

## 2024-06-21 VITALS
HEIGHT: 62 IN | WEIGHT: 113 LBS | SYSTOLIC BLOOD PRESSURE: 144 MMHG | DIASTOLIC BLOOD PRESSURE: 81 MMHG | OXYGEN SATURATION: 97 % | BODY MASS INDEX: 20.8 KG/M2 | HEART RATE: 93 BPM | TEMPERATURE: 97.9 F

## 2024-06-21 VITALS — DIASTOLIC BLOOD PRESSURE: 81 MMHG | SYSTOLIC BLOOD PRESSURE: 133 MMHG

## 2024-06-21 DIAGNOSIS — Z78.9 OTHER SPECIFIED HEALTH STATUS: ICD-10-CM

## 2024-06-21 DIAGNOSIS — Z13.1 ENCOUNTER FOR SCREENING FOR DIABETES MELLITUS: ICD-10-CM

## 2024-06-21 DIAGNOSIS — Z13.220 ENCOUNTER FOR SCREENING FOR LIPOID DISORDERS: ICD-10-CM

## 2024-06-21 DIAGNOSIS — Z12.39 ENCOUNTER FOR OTHER SCREENING FOR MALIGNANT NEOPLASM OF BREAST: ICD-10-CM

## 2024-06-21 DIAGNOSIS — K76.0 FATTY (CHANGE OF) LIVER, NOT ELSEWHERE CLASSIFIED: ICD-10-CM

## 2024-06-21 DIAGNOSIS — Z98.82 BREAST IMPLANT STATUS: ICD-10-CM

## 2024-06-21 DIAGNOSIS — Z23 ENCOUNTER FOR IMMUNIZATION: ICD-10-CM

## 2024-06-21 DIAGNOSIS — R00.2 PALPITATIONS: ICD-10-CM

## 2024-06-21 DIAGNOSIS — M54.50 LOW BACK PAIN, UNSPECIFIED: ICD-10-CM

## 2024-06-21 DIAGNOSIS — R77.8 OTHER SPECIFIED ABNORMALITIES OF PLASMA PROTEINS: ICD-10-CM

## 2024-06-21 DIAGNOSIS — M81.0 AGE-RELATED OSTEOPOROSIS W/OUT CURRENT PATHOLOGICAL FRACTURE: ICD-10-CM

## 2024-06-21 DIAGNOSIS — N63.10 UNSPECIFIED LUMP IN THE RIGHT BREAST, UNSPECIFIED QUADRANT: ICD-10-CM

## 2024-06-21 DIAGNOSIS — R92.30 DENSE BREASTS, UNSPECIFIED: ICD-10-CM

## 2024-06-21 DIAGNOSIS — G89.29 LOW BACK PAIN, UNSPECIFIED: ICD-10-CM

## 2024-06-21 DIAGNOSIS — Z00.00 ENCOUNTER FOR GENERAL ADULT MEDICAL EXAMINATION W/OUT ABNORMAL FINDINGS: ICD-10-CM

## 2024-06-21 DIAGNOSIS — K76.89 OTHER SPECIFIED DISEASES OF LIVER: ICD-10-CM

## 2024-06-21 PROCEDURE — 93000 ELECTROCARDIOGRAM COMPLETE: CPT

## 2024-06-21 PROCEDURE — G0439: CPT

## 2024-06-21 PROCEDURE — 99213 OFFICE O/P EST LOW 20 MIN: CPT | Mod: 25

## 2024-06-21 PROCEDURE — G0009: CPT

## 2024-06-21 PROCEDURE — 36415 COLL VENOUS BLD VENIPUNCTURE: CPT

## 2024-06-21 PROCEDURE — 90677 PCV20 VACCINE IM: CPT

## 2024-06-21 RX ORDER — EPINEPHRINE 0.3 MG/.3ML
0.3 INJECTION INTRAMUSCULAR
Qty: 1 | Refills: 1 | Status: ACTIVE | COMMUNITY
Start: 2022-12-23 | End: 1900-01-01

## 2024-06-21 RX ORDER — ALENDRONATE SODIUM 70 MG/1
70 TABLET ORAL
Qty: 12 | Refills: 1 | Status: ACTIVE | COMMUNITY
Start: 2024-06-21

## 2024-06-22 LAB
25(OH)D3 SERPL-MCNC: 48 NG/ML
ALBUMIN SERPL ELPH-MCNC: 5 G/DL
ALP BLD-CCNC: 44 U/L
ALT SERPL-CCNC: 16 U/L
ANION GAP SERPL CALC-SCNC: 15 MMOL/L
APPEARANCE: CLEAR
AST SERPL-CCNC: 22 U/L
BACTERIA: NEGATIVE /HPF
BASOPHILS # BLD AUTO: 0.05 K/UL
BASOPHILS NFR BLD AUTO: 0.9 %
BILIRUB SERPL-MCNC: 0.3 MG/DL
BILIRUBIN URINE: NEGATIVE
BLOOD URINE: NEGATIVE
BUN SERPL-MCNC: 15 MG/DL
CALCIUM SERPL-MCNC: 10.5 MG/DL
CAST: 0 /LPF
CHLORIDE SERPL-SCNC: 99 MMOL/L
CHOLEST SERPL-MCNC: 215 MG/DL
CO2 SERPL-SCNC: 26 MMOL/L
COLOR: YELLOW
CREAT SERPL-MCNC: 0.73 MG/DL
CREAT SPEC-SCNC: 29 MG/DL
EGFR: 88 ML/MIN/1.73M2
EOSINOPHIL # BLD AUTO: 0.1 K/UL
EOSINOPHIL NFR BLD AUTO: 1.8 %
EPITHELIAL CELLS: 0 /HPF
ESTIMATED AVERAGE GLUCOSE: 105 MG/DL
FOLATE SERPL-MCNC: >20 NG/ML
GLUCOSE QUALITATIVE U: NEGATIVE MG/DL
GLUCOSE SERPL-MCNC: 96 MG/DL
HBA1C MFR BLD HPLC: 5.3 %
HCT VFR BLD CALC: 41 %
HDLC SERPL-MCNC: 79 MG/DL
HGB BLD-MCNC: 13.3 G/DL
IMM GRANULOCYTES NFR BLD AUTO: 0.2 %
KETONES URINE: NEGATIVE MG/DL
LDLC SERPL CALC-MCNC: 120 MG/DL
LEUKOCYTE ESTERASE URINE: NEGATIVE
LYMPHOCYTES # BLD AUTO: 1.13 K/UL
LYMPHOCYTES NFR BLD AUTO: 20.8 %
MAN DIFF?: NORMAL
MCHC RBC-ENTMCNC: 32.2 PG
MCHC RBC-ENTMCNC: 32.4 GM/DL
MCV RBC AUTO: 99.3 FL
MICROALBUMIN 24H UR DL<=1MG/L-MCNC: <1.2 MG/DL
MICROALBUMIN/CREAT 24H UR-RTO: NORMAL MG/G
MICROSCOPIC-UA: NORMAL
MONOCYTES # BLD AUTO: 0.43 K/UL
MONOCYTES NFR BLD AUTO: 7.9 %
NEUTROPHILS # BLD AUTO: 3.72 K/UL
NEUTROPHILS NFR BLD AUTO: 68.4 %
NITRITE URINE: NEGATIVE
NONHDLC SERPL-MCNC: 136 MG/DL
PH URINE: 6.5
PLATELET # BLD AUTO: 189 K/UL
POTASSIUM SERPL-SCNC: 4.6 MMOL/L
PROT SERPL-MCNC: 7.6 G/DL
PROTEIN URINE: NEGATIVE MG/DL
RBC # BLD: 4.13 M/UL
RBC # FLD: 12.4 %
RED BLOOD CELLS URINE: 1 /HPF
SODIUM SERPL-SCNC: 141 MMOL/L
SPECIFIC GRAVITY URINE: 1.01
TRIGL SERPL-MCNC: 92 MG/DL
TSH SERPL-ACNC: 1.04 UIU/ML
UROBILINOGEN URINE: 0.2 MG/DL
VIT B12 SERPL-MCNC: 1247 PG/ML
WBC # FLD AUTO: 5.44 K/UL
WHITE BLOOD CELLS URINE: 0 /HPF

## 2024-06-24 LAB
ALBUMIN MFR SERPL ELPH: 65.8 %
ALBUMIN SERPL-MCNC: 5 G/DL
ALBUMIN/GLOB SERPL: 1.9 RATIO
ALPHA1 GLOB MFR SERPL ELPH: 3.6 %
ALPHA1 GLOB SERPL ELPH-MCNC: 0.3 G/DL
ALPHA2 GLOB MFR SERPL ELPH: 7.7 %
ALPHA2 GLOB SERPL ELPH-MCNC: 0.6 G/DL
B-GLOBULIN MFR SERPL ELPH: 9.7 %
B-GLOBULIN SERPL ELPH-MCNC: 0.7 G/DL
GAMMA GLOB FLD ELPH-MCNC: 1 G/DL
GAMMA GLOB MFR SERPL ELPH: 13.2 %
INTERPRETATION SERPL IEP-IMP: NORMAL
M PROTEIN MFR SERPL ELPH: 2.7 %
MONOCLON BAND OBS SERPL: 0.2 G/DL
PROT SERPL-MCNC: 7.6 G/DL
PROT SERPL-MCNC: 7.6 G/DL

## 2024-07-02 NOTE — HISTORY OF PRESENT ILLNESS
[Spouse] : spouse [FreeTextEntry1] : 68 year old woman presenting for CPE [de-identified] : She had seen endocrinologist Dr. Red last year about osteoporosis.  Saw Dr. Cassidy urologist at [facility], all testing unremarkable aside from some mild abnormality that she was referred to hematology for to r/o MM. I don't have test results available.   Dr. Edith Nelson at [facility], had steroid injections. Was considering RFA but she didn't respond to the steroids so she wasn't a candidate.  We reviewed March 2023 abdominal ultrasound findings: hepatic steatosis, She did have one episode in the last year of unexplained nausea with 1 episode of vomiting. Discussed that if it was an isolated episode I think it's unrelated to her liver findings  neurologist Dr. Mcclure for botox.

## 2024-07-02 NOTE — HEALTH RISK ASSESSMENT
[No falls in past year] : Patient reported no falls in the past year [0] : 2) Feeling down, depressed, or hopeless: Not at all (0) [[id number]] : 0 [Former] : Former [10-14] : 10-14 [> 15 Years] : > 15 Years

## 2024-07-02 NOTE — PHYSICAL EXAM
[No Acute Distress] : no acute distress [Well Nourished] : well nourished [Well Developed] : well developed [Well-Appearing] : well-appearing [Normal Sclera/Conjunctiva] : normal sclera/conjunctiva [Normal Outer Ear/Nose] : the outer ears and nose were normal in appearance [Normal Oropharynx] : the oropharynx was normal [No JVD] : no jugular venous distention [No Lymphadenopathy] : no lymphadenopathy [Supple] : supple [Thyroid Normal, No Nodules] : the thyroid was normal and there were no nodules present [No Respiratory Distress] : no respiratory distress  [No Accessory Muscle Use] : no accessory muscle use [Clear to Auscultation] : lungs were clear to auscultation bilaterally [Normal Rate] : normal rate  [Regular Rhythm] : with a regular rhythm [Normal S1, S2] : normal S1 and S2 [No Murmur] : no murmur heard [No Edema] : there was no peripheral edema [No Extremity Clubbing/Cyanosis] : no extremity clubbing/cyanosis [Soft] : abdomen soft [Non Tender] : non-tender [Non-distended] : non-distended [No Masses] : no abdominal mass palpated [No HSM] : no HSM [Normal Bowel Sounds] : normal bowel sounds [Normal Posterior Cervical Nodes] : no posterior cervical lymphadenopathy [Normal Anterior Cervical Nodes] : no anterior cervical lymphadenopathy [No CVA Tenderness] : no CVA  tenderness [No Spinal Tenderness] : no spinal tenderness [No Joint Swelling] : no joint swelling [Grossly Normal Strength/Tone] : grossly normal strength/tone [No Rash] : no rash [Coordination Grossly Intact] : coordination grossly intact [No Focal Deficits] : no focal deficits [Normal Gait] : normal gait [Normal Affect] : the affect was normal [Alert and Oriented x3] : oriented to person, place, and time [Normal Insight/Judgement] : insight and judgment were intact [de-identified] : Unable to sit comfortably for more than about a minute due to back pain.

## 2024-07-16 ENCOUNTER — APPOINTMENT (OUTPATIENT)
Dept: HEART AND VASCULAR | Facility: CLINIC | Age: 69
End: 2024-07-16

## 2024-07-24 ENCOUNTER — APPOINTMENT (OUTPATIENT)
Dept: HEART AND VASCULAR | Facility: CLINIC | Age: 69
End: 2024-07-24
Payer: MEDICARE

## 2024-07-24 VITALS
SYSTOLIC BLOOD PRESSURE: 132 MMHG | TEMPERATURE: 98.2 F | HEIGHT: 62 IN | HEART RATE: 84 BPM | OXYGEN SATURATION: 98 % | BODY MASS INDEX: 21.16 KG/M2 | WEIGHT: 115 LBS | DIASTOLIC BLOOD PRESSURE: 78 MMHG

## 2024-07-24 DIAGNOSIS — R00.2 PALPITATIONS: ICD-10-CM

## 2024-07-24 DIAGNOSIS — R03.0 ELEVATED BLOOD-PRESSURE READING, W/OUT DIAGNOSIS OF HYPERTENSION: ICD-10-CM

## 2024-07-24 DIAGNOSIS — Q23.1 CONGENITAL INSUFFICIENCY OF AORTIC VALVE: ICD-10-CM

## 2024-07-24 PROCEDURE — G2211 COMPLEX E/M VISIT ADD ON: CPT

## 2024-07-24 PROCEDURE — 99214 OFFICE O/P EST MOD 30 MIN: CPT

## 2024-07-25 NOTE — DISCUSSION/SUMMARY
[FreeTextEntry1] : Palps / bicuspid AV check echo if able to approve and schedule. HTN - WILEY  and I had an extensive discussion regarding her blood pressure management. Patient will continue taking current medications in addition to maintaining a low Na diet, with periodic b/p checks at home.

## 2024-07-25 NOTE — REASON FOR VISIT
[Symptom and Test Evaluation] : symptom and test evaluation [FreeTextEntry1] : 67 year old female presents for a visit. She remarks on occasional palpitations. Symptoms noted at rest and less so with activity. Symptoms are intermittent. No dizziness or syncope. No recent testing. She does have a history of bicuspid AV.

## 2024-08-07 ENCOUNTER — APPOINTMENT (OUTPATIENT)
Dept: RADIOLOGY | Facility: CLINIC | Age: 69
End: 2024-08-07

## 2024-08-07 ENCOUNTER — OUTPATIENT (OUTPATIENT)
Dept: OUTPATIENT SERVICES | Facility: HOSPITAL | Age: 69
LOS: 1 days | End: 2024-08-07

## 2024-08-07 PROCEDURE — 77085 DXA BONE DENSITY AXL VRT FX: CPT | Mod: 26

## 2024-08-21 ENCOUNTER — NON-APPOINTMENT (OUTPATIENT)
Age: 69
End: 2024-08-21

## 2024-08-30 ENCOUNTER — APPOINTMENT (OUTPATIENT)
Dept: HEMATOLOGY ONCOLOGY | Facility: CLINIC | Age: 69
End: 2024-08-30
Payer: MEDICARE

## 2024-08-30 VITALS
HEART RATE: 86 BPM | OXYGEN SATURATION: 98 % | WEIGHT: 112 LBS | TEMPERATURE: 98.7 F | SYSTOLIC BLOOD PRESSURE: 136 MMHG | DIASTOLIC BLOOD PRESSURE: 86 MMHG | HEIGHT: 62 IN | BODY MASS INDEX: 20.61 KG/M2

## 2024-08-30 DIAGNOSIS — D47.2 MONOCLONAL GAMMOPATHY: ICD-10-CM

## 2024-08-30 PROCEDURE — 99203 OFFICE O/P NEW LOW 30 MIN: CPT

## 2024-08-30 NOTE — REVIEW OF SYSTEMS
[Fever] : no fever [Fatigue] : fatigue [Recent Change In Weight] : ~T no recent weight change [Abdominal Pain] : no abdominal pain [Vomiting] : no vomiting [Joint Pain] : joint pain [Joint Stiffness] : joint stiffness [Muscle Pain] : muscle pain [Muscle Weakness] : muscle weakness Detail Level: Generalized [Skin Rash] : no skin rash [Skin Wound] : no skin wound [Difficulty Walking] : difficulty walking [Negative] : Heme/Lymph [FreeTextEntry7] : + bowel and bladder dysfunction - has had extensive eval for this - felt to be neurogenic [FreeTextEntry8] : + urinary retention as above [FreeTextEntry9] : + back pain [de-identified] : + neuropatic pruritus [de-identified] : + sciatica occasionally.  chronic hemicrania.

## 2024-08-30 NOTE — REVIEW OF SYSTEMS
[Fever] : no fever [Fatigue] : fatigue [Recent Change In Weight] : ~T no recent weight change [Abdominal Pain] : no abdominal pain [Vomiting] : no vomiting [Joint Pain] : joint pain [Joint Stiffness] : joint stiffness [Muscle Pain] : muscle pain [Muscle Weakness] : muscle weakness [Skin Rash] : no skin rash [Skin Wound] : no skin wound [Difficulty Walking] : difficulty walking [Negative] : Heme/Lymph [FreeTextEntry7] : + bowel and bladder dysfunction - has had extensive eval for this - felt to be neurogenic [FreeTextEntry8] : + urinary retention as above [FreeTextEntry9] : + back pain [de-identified] : + neuropatic pruritus [de-identified] : + sciatica occasionally.  chronic hemicrania.

## 2024-08-30 NOTE — HISTORY OF PRESENT ILLNESS
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 She is accompanied by her , Kai.  The patient has osteoporosis and saw an endocrinologist at [facility] for consultation regarding this condition.  As part of the evaluation for her osteoporosis, a SPEP was checked.  per the patient, this showed an abnormality and she was referred back to her PCP.  Dr Beatty did labs in June 2024.  SPEP showed a 0.2 g/dL IgG kappa M spike.  CBC, creatinine, calcium levels normal.  The patient has chronic back pain, sciatica, and chronic joint pain.  She says she has lived w/ these problems for 30 years.  Two years ago, an incident occurred that caused her pain to acutely worsen.  She says she has undergone extensive specialist evaluations and imaging.  None of her pain has been progressive from two years ago, but she lives w/ chronic pain for which she requires narcotic analgesics.  PMH, PSH reviewed and updated in the chart. FMH - negative for blood diseases and cancer SH - .  former smoker.  No regular ETOH.  retired.

## 2024-08-30 NOTE — HISTORY OF PRESENT ILLNESS
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 She is accompanied by her , Kai.  The patient has osteoporosis and saw an endocrinologist at [facility] for consultation regarding this condition.  As part of the evaluation for her osteoporosis, a SPEP was checked.  per the patient, this showed an abnormality and she was referred back to her PCP.  Dr Beatty did labs in June 2024.  SPEP showed a 0.2 g/dL IgG kappa M spike.  CBC, creatinine, calcium levels normal.  The patient has chronic back pain, sciatica, and chronic joint pain.  She says she has lived w/ these problems for 30 years.  Two years ago, an incident occurred that caused her pain to acutely worsen.  She says she has undergone extensive specialist evaluations and imaging.  None of her pain has been progressive from two years ago, but she lives w/ chronic pain for which she requires narcotic analgesics.  PMH, PSH reviewed and updated in the chart. FMH - negative for blood diseases and cancer SH - .  former smoker.  No regular ETOH.  retired.

## 2024-08-30 NOTE — PHYSICAL EXAM
[Normal] : affect appropriate [de-identified] : not distended [de-identified] : AAO x 3, speech normal.  gait normal.

## 2024-08-30 NOTE — ASSESSMENT
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  Detected on evaluation for osteoporosis.  M spike in 6/2024 was 0.2 g/dL.  I reviewed the patient's lab results w/ her and her .  I explained the spectrum of plasma cell disorders.  Based on the history, physical examination and labs, MGUS is suspected.  No indication for a symptomatic plasma cell dyscrasia.  Recommend re-evaluation in Summer 2025 with labs to include CBC, CMP, SPEP, free light chains.    All questions answered and return precautions reviewed.

## 2024-08-30 NOTE — PHYSICAL EXAM
[Normal] : affect appropriate [de-identified] : not distended [de-identified] : AAO x 3, speech normal.  gait normal.

## 2024-10-24 ENCOUNTER — OFFICE (OUTPATIENT)
Dept: URBAN - METROPOLITAN AREA CLINIC 92 | Facility: CLINIC | Age: 69
Setting detail: OPHTHALMOLOGY
End: 2024-10-24
Payer: MEDICARE

## 2024-10-24 DIAGNOSIS — H35.54: ICD-10-CM

## 2024-10-24 DIAGNOSIS — H35.433: ICD-10-CM

## 2024-10-24 DIAGNOSIS — H50.011: ICD-10-CM

## 2024-10-24 DIAGNOSIS — Z96.1: ICD-10-CM

## 2024-10-24 DIAGNOSIS — H16.223: ICD-10-CM

## 2024-10-24 DIAGNOSIS — H26.493: ICD-10-CM

## 2024-10-24 DIAGNOSIS — H35.432: ICD-10-CM

## 2024-10-24 DIAGNOSIS — H35.363: ICD-10-CM

## 2024-10-24 DIAGNOSIS — H11.823: ICD-10-CM

## 2024-10-24 DIAGNOSIS — H02.89: ICD-10-CM

## 2024-10-24 DIAGNOSIS — H35.431: ICD-10-CM

## 2024-10-24 DIAGNOSIS — H35.3131: ICD-10-CM

## 2024-10-24 PROCEDURE — 92014 COMPRE OPH EXAM EST PT 1/>: CPT | Performed by: SPECIALIST

## 2024-10-24 PROCEDURE — 92134 CPTRZ OPH DX IMG PST SGM RTA: CPT | Performed by: SPECIALIST

## 2024-10-24 ASSESSMENT — REFRACTION_CURRENTRX
OD_ADD: +2.50
OS_SPHERE: -2.75
OS_VPRISM_DIRECTION: PROGS
OD_VPRISM_DIRECTION: PROGS
OD_SPHERE: -3.50
OS_CYLINDER: +1.50
OS_AXIS: 059
OS_CYLINDER: -2.75
OD_AXIS: 101
OS_ADD: +2.50
OS_AXIS: 165
OD_OVR_VA: 20/
OD_OVR_VA: 20/
OS_OVR_VA: 20/
OD_ADD: +2.25
OD_CYLINDER: +1.25
OS_SPHERE: -3.75
OS_OVR_VA: 20/
OD_CYLINDER: -2.50
OD_SPHERE: -1.50

## 2024-10-24 ASSESSMENT — LID EXAM ASSESSMENTS
OS_MEIBOMITIS: LLL 1+
OD_MEIBOMITIS: RLL 1+

## 2024-10-24 ASSESSMENT — REFRACTION_MANIFEST
OS_ADD: +2.50
OD_CYLINDER: +2.00
OS_VA1: 20/30
OU_VA: 20/30
OS_SPHERE: -4.25
OD_CYLINDER: +2.50
OD_AXIS: 5
OD_SPHERE: -4.00
OS_AXIS: 150
OS_ADD: +2.50
OD_ADD: +2.50
OS_AXIS: 150
OS_CYLINDER: +1.75
OD_AXIS: 010
OD_VA1: 20/25
OD_ADD: +2.50
OS_VA1: 20/40-2
OS_AXIS: 145
OD_VA1: 20/40-
OS_ADD: +2.50
OD_ADD: +2.50
OS_SPHERE: -3.75
OS_CYLINDER: +2.25
OD_VA2: 20/20
OD_AXIS: 10
OS_CYLINDER: +2.75
OS_VA2: 20/20
OD_SPHERE: -3.50
OS_SPHERE: -5.25
OD_SPHERE: -4.00
OD_CYLINDER: +2.00

## 2024-10-24 ASSESSMENT — KERATOMETRY
OS_K2POWER_DIOPTERS: 43.75
OD_AXISANGLE_DEGREES: 076
OS_K1POWER_DIOPTERS: 43.00
OS_AXISANGLE_DEGREES: 097
OD_K1POWER_DIOPTERS: 40.50
OD_K2POWER_DIOPTERS: 41.75
METHOD_AUTO_MANUAL: AUTO

## 2024-10-24 ASSESSMENT — REFRACTION_AUTOREFRACTION
OD_CYLINDER: +3.50
OS_SPHERE: -2.25
OS_CYLINDER: +0.50
OD_AXIS: 004
OS_AXIS: 139
OD_SPHERE: -4.50

## 2024-10-24 ASSESSMENT — TONOMETRY
OS_IOP_MMHG: 14
OD_IOP_MMHG: 14

## 2024-10-24 ASSESSMENT — CONFRONTATIONAL VISUAL FIELD TEST (CVF)
OS_FINDINGS: FULL
OD_FINDINGS: FULL

## 2024-10-24 ASSESSMENT — VISUAL ACUITY
OD_BCVA: 20/30+
OS_BCVA: 20/25-2

## 2024-10-24 ASSESSMENT — SUPERFICIAL PUNCTATE KERATITIS (SPK)
OS_SPK: 2+
OD_SPK: 1+

## 2024-11-13 ENCOUNTER — APPOINTMENT (OUTPATIENT)
Dept: HEART AND VASCULAR | Facility: CLINIC | Age: 69
End: 2024-11-13

## 2024-11-19 ENCOUNTER — APPOINTMENT (OUTPATIENT)
Dept: OTOLARYNGOLOGY | Facility: CLINIC | Age: 69
End: 2024-11-19

## 2024-11-26 ENCOUNTER — APPOINTMENT (OUTPATIENT)
Dept: OTOLARYNGOLOGY | Facility: CLINIC | Age: 69
End: 2024-11-26
Payer: MEDICARE

## 2024-11-26 VITALS
SYSTOLIC BLOOD PRESSURE: 149 MMHG | BODY MASS INDEX: 20.61 KG/M2 | HEIGHT: 62 IN | OXYGEN SATURATION: 98 % | TEMPERATURE: 98.2 F | DIASTOLIC BLOOD PRESSURE: 74 MMHG | HEART RATE: 86 BPM | WEIGHT: 112 LBS

## 2024-11-26 DIAGNOSIS — J38.5 LARYNGEAL SPASM: ICD-10-CM

## 2024-11-26 DIAGNOSIS — J38.7 OTHER DISEASES OF LARYNX: ICD-10-CM

## 2024-11-26 DIAGNOSIS — H61.23 IMPACTED CERUMEN, BILATERAL: ICD-10-CM

## 2024-11-26 DIAGNOSIS — H90.3 SENSORINEURAL HEARING LOSS, BILATERAL: ICD-10-CM

## 2024-11-26 DIAGNOSIS — R09.81 NASAL CONGESTION: ICD-10-CM

## 2024-11-26 PROCEDURE — 99214 OFFICE O/P EST MOD 30 MIN: CPT | Mod: 25

## 2024-11-26 PROCEDURE — 31231 NASAL ENDOSCOPY DX: CPT

## 2024-11-26 PROCEDURE — 69210 REMOVE IMPACTED EAR WAX UNI: CPT

## 2024-11-26 RX ORDER — FLUTICASONE PROPIONATE 50 UG/1
50 SPRAY, METERED NASAL
Qty: 3 | Refills: 1 | Status: ACTIVE | COMMUNITY
Start: 2024-11-26 | End: 1900-01-01

## 2024-12-02 ENCOUNTER — APPOINTMENT (OUTPATIENT)
Dept: HEART AND VASCULAR | Facility: CLINIC | Age: 69
End: 2024-12-02
Payer: MEDICARE

## 2024-12-02 VITALS
WEIGHT: 117 LBS | SYSTOLIC BLOOD PRESSURE: 145 MMHG | HEIGHT: 62 IN | DIASTOLIC BLOOD PRESSURE: 70 MMHG | HEART RATE: 86 BPM | OXYGEN SATURATION: 96 % | BODY MASS INDEX: 21.53 KG/M2 | TEMPERATURE: 98.1 F

## 2024-12-02 DIAGNOSIS — Q23.81 BICUSPID AORTIC VALVE: ICD-10-CM

## 2024-12-02 DIAGNOSIS — R00.2 PALPITATIONS: ICD-10-CM

## 2024-12-02 PROCEDURE — G2211 COMPLEX E/M VISIT ADD ON: CPT

## 2024-12-02 PROCEDURE — 99214 OFFICE O/P EST MOD 30 MIN: CPT

## 2024-12-02 PROCEDURE — 93306 TTE W/DOPPLER COMPLETE: CPT

## 2025-01-21 ENCOUNTER — APPOINTMENT (OUTPATIENT)
Dept: OTOLARYNGOLOGY | Facility: CLINIC | Age: 70
End: 2025-01-21

## 2025-06-27 ENCOUNTER — TRANSCRIPTION ENCOUNTER (OUTPATIENT)
Age: 70
End: 2025-06-27

## 2025-07-01 ENCOUNTER — APPOINTMENT (OUTPATIENT)
Dept: INTERNAL MEDICINE | Facility: CLINIC | Age: 70
End: 2025-07-01

## 2025-08-15 ENCOUNTER — APPOINTMENT (OUTPATIENT)
Dept: HEART AND VASCULAR | Facility: CLINIC | Age: 70
End: 2025-08-15

## 2025-08-25 ENCOUNTER — APPOINTMENT (OUTPATIENT)
Dept: HEART AND VASCULAR | Facility: CLINIC | Age: 70
End: 2025-08-25

## 2025-08-26 ENCOUNTER — TRANSCRIPTION ENCOUNTER (OUTPATIENT)
Age: 70
End: 2025-08-26

## 2025-08-26 DIAGNOSIS — D47.2 MONOCLONAL GAMMOPATHY: ICD-10-CM
